# Patient Record
Sex: FEMALE | Race: BLACK OR AFRICAN AMERICAN | NOT HISPANIC OR LATINO | Employment: OTHER
[De-identification: names, ages, dates, MRNs, and addresses within clinical notes are randomized per-mention and may not be internally consistent; named-entity substitution may affect disease eponyms.]

---

## 2017-08-19 ENCOUNTER — HEALTH MAINTENANCE LETTER (OUTPATIENT)
Age: 37
End: 2017-08-19

## 2023-02-10 ENCOUNTER — TRANSFERRED RECORDS (OUTPATIENT)
Dept: HEALTH INFORMATION MANAGEMENT | Facility: CLINIC | Age: 43
End: 2023-02-10

## 2023-04-05 LAB
HEMOGLOBIN (EXTERNAL): 14 G/DL (ref 12–16)
HEPATITIS B SURFACE ANTIGEN (EXTERNAL): NONREACTIVE
HEPATITIS C ANTIBODY (EXTERNAL): NONREACTIVE
HIV1+2 AB SERPL QL IA: NONREACTIVE
PLATELET COUNT (EXTERNAL): 263 10E3/UL (ref 150–400)
RUBELLA ANTIBODY IGG (EXTERNAL): NORMAL
TREPONEMA PALLIDUM ANTIBODY (EXTERNAL): NONREACTIVE

## 2023-04-10 ENCOUNTER — MEDICAL CORRESPONDENCE (OUTPATIENT)
Dept: HEALTH INFORMATION MANAGEMENT | Facility: CLINIC | Age: 43
End: 2023-04-10
Payer: COMMERCIAL

## 2023-04-10 ENCOUNTER — TRANSCRIBE ORDERS (OUTPATIENT)
Dept: MATERNAL FETAL MEDICINE | Facility: HOSPITAL | Age: 43
End: 2023-04-10
Payer: COMMERCIAL

## 2023-04-10 DIAGNOSIS — O26.90 PREGNANCY RELATED CONDITION, ANTEPARTUM: Primary | ICD-10-CM

## 2023-08-10 ENCOUNTER — MEDICAL CORRESPONDENCE (OUTPATIENT)
Dept: HEALTH INFORMATION MANAGEMENT | Facility: CLINIC | Age: 43
End: 2023-08-10
Payer: COMMERCIAL

## 2023-08-10 ENCOUNTER — TRANSCRIBE ORDERS (OUTPATIENT)
Dept: MATERNAL FETAL MEDICINE | Facility: HOSPITAL | Age: 43
End: 2023-08-10
Payer: COMMERCIAL

## 2023-08-10 DIAGNOSIS — O26.90 PREGNANCY RELATED CONDITION, ANTEPARTUM: Primary | ICD-10-CM

## 2023-08-14 ENCOUNTER — PRE VISIT (OUTPATIENT)
Dept: MATERNAL FETAL MEDICINE | Facility: HOSPITAL | Age: 43
End: 2023-08-14
Payer: COMMERCIAL

## 2023-08-16 ENCOUNTER — OFFICE VISIT (OUTPATIENT)
Dept: MATERNAL FETAL MEDICINE | Facility: HOSPITAL | Age: 43
End: 2023-08-16
Attending: OBSTETRICS & GYNECOLOGY
Payer: COMMERCIAL

## 2023-08-16 ENCOUNTER — ANCILLARY PROCEDURE (OUTPATIENT)
Dept: ULTRASOUND IMAGING | Facility: HOSPITAL | Age: 43
End: 2023-08-16
Attending: OBSTETRICS & GYNECOLOGY
Payer: COMMERCIAL

## 2023-08-16 DIAGNOSIS — O09.522 MULTIGRAVIDA OF ADVANCED MATERNAL AGE IN SECOND TRIMESTER: Primary | ICD-10-CM

## 2023-08-16 DIAGNOSIS — O26.90 PREGNANCY RELATED CONDITION, ANTEPARTUM: ICD-10-CM

## 2023-08-16 PROCEDURE — 76811 OB US DETAILED SNGL FETUS: CPT | Mod: 26 | Performed by: OBSTETRICS & GYNECOLOGY

## 2023-08-16 PROCEDURE — 99207 PR NO CHARGE LOS: CPT | Performed by: OBSTETRICS & GYNECOLOGY

## 2023-08-16 PROCEDURE — 76811 OB US DETAILED SNGL FETUS: CPT

## 2023-08-16 NOTE — NURSING NOTE
Racheal Banks is a  at 27w4d who presents to [facility] for a comprehensive ultrasound. Pt reports positive fetal movement. SBAR given to Dr. Knox, see note in Epic.      Mira Rubin RN

## 2023-08-16 NOTE — PROGRESS NOTES
Please see full imaging report from ViewPoint program under imaging tab.    Jeremy Knox MD  Maternal Fetal Medicine

## 2023-10-11 RX ORDER — INSULIN ASPART 100 [IU]/ML
INJECTION, SOLUTION INTRAVENOUS; SUBCUTANEOUS
Qty: 15 ML | OUTPATIENT
Start: 2023-10-11

## 2023-10-11 RX ORDER — INSULIN ASPART 100 [IU]/ML
10 INJECTION, SOLUTION INTRAVENOUS; SUBCUTANEOUS
Status: ON HOLD | COMMUNITY
Start: 2023-10-11 | End: 2023-11-10

## 2023-10-11 NOTE — TELEPHONE ENCOUNTER
Pending Prescriptions:                       Disp   Refills    Insulin Aspart FlexPen 100 UNIT/ML SOPN   15 mL             Signed Prescriptions:                        Disp   Refills    Insulin Aspart FlexPen 100 UNIT/ML SOPN                    Authorizing Provider: PATIENT REPORTED  Ordering User: DENISHA ERIC

## 2023-10-20 LAB — GROUP B STREPTOCOCCUS (EXTERNAL): NEGATIVE

## 2023-11-07 ENCOUNTER — TRANSFERRED RECORDS (OUTPATIENT)
Dept: HEALTH INFORMATION MANAGEMENT | Facility: CLINIC | Age: 43
End: 2023-11-07
Payer: COMMERCIAL

## 2023-11-09 ENCOUNTER — HOSPITAL ENCOUNTER (INPATIENT)
Facility: HOSPITAL | Age: 43
LOS: 2 days | Discharge: HOME | End: 2023-11-11
Attending: ADVANCED PRACTICE MIDWIFE | Admitting: ADVANCED PRACTICE MIDWIFE
Payer: COMMERCIAL

## 2023-11-09 PROBLEM — Z34.90 PREGNANT: Status: ACTIVE | Noted: 2023-11-09

## 2023-11-09 LAB
ABO/RH(D): NORMAL
ABO/RH(D): NORMAL
ANTIBODY SCREEN: NEGATIVE
BASOPHILS # BLD AUTO: 0 10E3/UL (ref 0–0.2)
BASOPHILS NFR BLD AUTO: 0 %
EOSINOPHIL # BLD AUTO: 0 10E3/UL (ref 0–0.7)
EOSINOPHIL NFR BLD AUTO: 0 %
ERYTHROCYTE [DISTWIDTH] IN BLOOD BY AUTOMATED COUNT: 14.6 % (ref 10–15)
GLUCOSE BLDC GLUCOMTR-MCNC: 104 MG/DL (ref 70–99)
GLUCOSE BLDC GLUCOMTR-MCNC: 116 MG/DL (ref 70–99)
GLUCOSE BLDC GLUCOMTR-MCNC: 125 MG/DL (ref 70–99)
GLUCOSE BLDC GLUCOMTR-MCNC: 130 MG/DL (ref 70–99)
GLUCOSE BLDC GLUCOMTR-MCNC: 137 MG/DL (ref 70–99)
GLUCOSE BLDC GLUCOMTR-MCNC: 197 MG/DL (ref 70–99)
HCT VFR BLD AUTO: 40.8 % (ref 35–47)
HGB BLD-MCNC: 13.7 G/DL (ref 11.7–15.7)
HGB BLD-MCNC: 13.7 G/DL (ref 11.7–15.7)
IMM GRANULOCYTES # BLD: 0.1 10E3/UL
IMM GRANULOCYTES NFR BLD: 1 %
LYMPHOCYTES # BLD AUTO: 1.1 10E3/UL (ref 0.8–5.3)
LYMPHOCYTES NFR BLD AUTO: 16 %
MCH RBC QN AUTO: 29.4 PG (ref 26.5–33)
MCHC RBC AUTO-ENTMCNC: 33.2 G/DL (ref 31.5–36.5)
MCV RBC AUTO: 88 FL (ref 78–100)
MONOCYTES # BLD AUTO: 0.4 10E3/UL (ref 0–1.3)
MONOCYTES NFR BLD AUTO: 7 %
NEUTROPHILS # BLD AUTO: 5.2 10E3/UL (ref 1.6–8.3)
NEUTROPHILS NFR BLD AUTO: 76 %
NRBC # BLD AUTO: 0 10E3/UL
NRBC BLD AUTO-RTO: 0 /100
PLATELET # BLD AUTO: 91 10E3/UL (ref 150–450)
RBC # BLD AUTO: 4.66 10E6/UL (ref 3.8–5.2)
SPECIMEN EXPIRATION DATE: NORMAL
SPECIMEN EXPIRATION DATE: NORMAL
T PALLIDUM AB SER QL: NONREACTIVE
WBC # BLD AUTO: 6.7 10E3/UL (ref 4–11)

## 2023-11-09 PROCEDURE — 10907ZC DRAINAGE OF AMNIOTIC FLUID, THERAPEUTIC FROM PRODUCTS OF CONCEPTION, VIA NATURAL OR ARTIFICIAL OPENING: ICD-10-PCS | Performed by: ADVANCED PRACTICE MIDWIFE

## 2023-11-09 PROCEDURE — 250N000013 HC RX MED GY IP 250 OP 250 PS 637: Performed by: ADVANCED PRACTICE MIDWIFE

## 2023-11-09 PROCEDURE — 722N000001 HC LABOR CARE VAGINAL DELIVERY SINGLE

## 2023-11-09 PROCEDURE — 85025 COMPLETE CBC W/AUTO DIFF WBC: CPT | Performed by: ADVANCED PRACTICE MIDWIFE

## 2023-11-09 PROCEDURE — 86901 BLOOD TYPING SEROLOGIC RH(D): CPT | Performed by: REGISTERED NURSE

## 2023-11-09 PROCEDURE — 85018 HEMOGLOBIN: CPT | Performed by: REGISTERED NURSE

## 2023-11-09 PROCEDURE — 36415 COLL VENOUS BLD VENIPUNCTURE: CPT | Performed by: REGISTERED NURSE

## 2023-11-09 PROCEDURE — 120N000001 HC R&B MED SURG/OB

## 2023-11-09 PROCEDURE — 250N000013 HC RX MED GY IP 250 OP 250 PS 637: Performed by: REGISTERED NURSE

## 2023-11-09 PROCEDURE — 258N000003 HC RX IP 258 OP 636: Performed by: REGISTERED NURSE

## 2023-11-09 PROCEDURE — 0DQR0ZZ REPAIR ANAL SPHINCTER, OPEN APPROACH: ICD-10-PCS | Performed by: OBSTETRICS & GYNECOLOGY

## 2023-11-09 PROCEDURE — 86780 TREPONEMA PALLIDUM: CPT | Performed by: REGISTERED NURSE

## 2023-11-09 PROCEDURE — 250N000011 HC RX IP 250 OP 636: Mod: JZ | Performed by: REGISTERED NURSE

## 2023-11-09 PROCEDURE — 250N000009 HC RX 250: Performed by: REGISTERED NURSE

## 2023-11-09 RX ORDER — OXYCODONE HYDROCHLORIDE 5 MG/1
5 TABLET ORAL EVERY 4 HOURS PRN
Status: DISCONTINUED | OUTPATIENT
Start: 2023-11-09 | End: 2023-11-11 | Stop reason: HOSPADM

## 2023-11-09 RX ORDER — OXYTOCIN/0.9 % SODIUM CHLORIDE 30/500 ML
100-340 PLASTIC BAG, INJECTION (ML) INTRAVENOUS CONTINUOUS PRN
Status: DISCONTINUED | OUTPATIENT
Start: 2023-11-09 | End: 2023-11-11 | Stop reason: HOSPADM

## 2023-11-09 RX ORDER — HYDROCORTISONE 25 MG/G
CREAM TOPICAL 3 TIMES DAILY PRN
Status: DISCONTINUED | OUTPATIENT
Start: 2023-11-09 | End: 2023-11-11 | Stop reason: HOSPADM

## 2023-11-09 RX ORDER — NALOXONE HYDROCHLORIDE 0.4 MG/ML
0.4 INJECTION, SOLUTION INTRAMUSCULAR; INTRAVENOUS; SUBCUTANEOUS
Status: DISCONTINUED | OUTPATIENT
Start: 2023-11-09 | End: 2023-11-11 | Stop reason: HOSPADM

## 2023-11-09 RX ORDER — KETOROLAC TROMETHAMINE 30 MG/ML
30 INJECTION, SOLUTION INTRAMUSCULAR; INTRAVENOUS
Status: DISCONTINUED | OUTPATIENT
Start: 2023-11-09 | End: 2023-11-11 | Stop reason: HOSPADM

## 2023-11-09 RX ORDER — ENOXAPARIN SODIUM 100 MG/ML
40 INJECTION SUBCUTANEOUS DAILY
Status: ON HOLD | COMMUNITY
Start: 2023-10-24 | End: 2023-11-10

## 2023-11-09 RX ORDER — CARBOPROST TROMETHAMINE 250 UG/ML
250 INJECTION, SOLUTION INTRAMUSCULAR
Status: DISCONTINUED | OUTPATIENT
Start: 2023-11-09 | End: 2023-11-09 | Stop reason: HOSPADM

## 2023-11-09 RX ORDER — CITRIC ACID/SODIUM CITRATE 334-500MG
30 SOLUTION, ORAL ORAL
Status: DISCONTINUED | OUTPATIENT
Start: 2023-11-09 | End: 2023-11-09 | Stop reason: HOSPADM

## 2023-11-09 RX ORDER — MISOPROSTOL 200 UG/1
800 TABLET ORAL
Status: DISCONTINUED | OUTPATIENT
Start: 2023-11-09 | End: 2023-11-11 | Stop reason: HOSPADM

## 2023-11-09 RX ORDER — NALOXONE HYDROCHLORIDE 0.4 MG/ML
0.2 INJECTION, SOLUTION INTRAMUSCULAR; INTRAVENOUS; SUBCUTANEOUS
Status: DISCONTINUED | OUTPATIENT
Start: 2023-11-09 | End: 2023-11-09 | Stop reason: HOSPADM

## 2023-11-09 RX ORDER — DOCUSATE SODIUM 100 MG/1
100 CAPSULE, LIQUID FILLED ORAL 2 TIMES DAILY
Status: DISCONTINUED | OUTPATIENT
Start: 2023-11-09 | End: 2023-11-11 | Stop reason: HOSPADM

## 2023-11-09 RX ORDER — OXYTOCIN 10 [USP'U]/ML
10 INJECTION, SOLUTION INTRAMUSCULAR; INTRAVENOUS
Status: DISCONTINUED | OUTPATIENT
Start: 2023-11-09 | End: 2023-11-11 | Stop reason: HOSPADM

## 2023-11-09 RX ORDER — DEXTROSE MONOHYDRATE 25 G/50ML
25-50 INJECTION, SOLUTION INTRAVENOUS
Status: DISCONTINUED | OUTPATIENT
Start: 2023-11-09 | End: 2023-11-11 | Stop reason: HOSPADM

## 2023-11-09 RX ORDER — METOCLOPRAMIDE 10 MG/1
10 TABLET ORAL EVERY 6 HOURS PRN
Status: DISCONTINUED | OUTPATIENT
Start: 2023-11-09 | End: 2023-11-09 | Stop reason: HOSPADM

## 2023-11-09 RX ORDER — DEXTROSE MONOHYDRATE 25 G/50ML
25-50 INJECTION, SOLUTION INTRAVENOUS
Status: DISCONTINUED | OUTPATIENT
Start: 2023-11-09 | End: 2023-11-09 | Stop reason: HOSPADM

## 2023-11-09 RX ORDER — MISOPROSTOL 200 UG/1
400 TABLET ORAL
Status: DISCONTINUED | OUTPATIENT
Start: 2023-11-09 | End: 2023-11-11 | Stop reason: HOSPADM

## 2023-11-09 RX ORDER — NALOXONE HYDROCHLORIDE 0.4 MG/ML
0.2 INJECTION, SOLUTION INTRAMUSCULAR; INTRAVENOUS; SUBCUTANEOUS
Status: DISCONTINUED | OUTPATIENT
Start: 2023-11-09 | End: 2023-11-11 | Stop reason: HOSPADM

## 2023-11-09 RX ORDER — NALOXONE HYDROCHLORIDE 0.4 MG/ML
0.4 INJECTION, SOLUTION INTRAMUSCULAR; INTRAVENOUS; SUBCUTANEOUS
Status: DISCONTINUED | OUTPATIENT
Start: 2023-11-09 | End: 2023-11-09 | Stop reason: HOSPADM

## 2023-11-09 RX ORDER — NICOTINE POLACRILEX 4 MG
15-30 LOZENGE BUCCAL
Status: DISCONTINUED | OUTPATIENT
Start: 2023-11-09 | End: 2023-11-11 | Stop reason: HOSPADM

## 2023-11-09 RX ORDER — METHYLERGONOVINE MALEATE 0.2 MG/ML
200 INJECTION INTRAVENOUS
Status: DISCONTINUED | OUTPATIENT
Start: 2023-11-09 | End: 2023-11-09 | Stop reason: HOSPADM

## 2023-11-09 RX ORDER — MODIFIED LANOLIN
OINTMENT (GRAM) TOPICAL
Status: DISCONTINUED | OUTPATIENT
Start: 2023-11-09 | End: 2023-11-11 | Stop reason: HOSPADM

## 2023-11-09 RX ORDER — PROCHLORPERAZINE 25 MG
25 SUPPOSITORY, RECTAL RECTAL EVERY 12 HOURS PRN
Status: DISCONTINUED | OUTPATIENT
Start: 2023-11-09 | End: 2023-11-09 | Stop reason: HOSPADM

## 2023-11-09 RX ORDER — OXYTOCIN 10 [USP'U]/ML
10 INJECTION, SOLUTION INTRAMUSCULAR; INTRAVENOUS
Status: DISCONTINUED | OUTPATIENT
Start: 2023-11-09 | End: 2023-11-09 | Stop reason: HOSPADM

## 2023-11-09 RX ORDER — SODIUM CHLORIDE 9 MG/ML
INJECTION, SOLUTION INTRAVENOUS CONTINUOUS
Status: DISCONTINUED | OUTPATIENT
Start: 2023-11-09 | End: 2023-11-09 | Stop reason: HOSPADM

## 2023-11-09 RX ORDER — INSULIN DETEMIR 100 [IU]/ML
22 INJECTION, SOLUTION SUBCUTANEOUS AT BEDTIME
Status: ON HOLD | COMMUNITY
Start: 2023-11-01 | End: 2023-11-10

## 2023-11-09 RX ORDER — NICOTINE POLACRILEX 4 MG
15-30 LOZENGE BUCCAL
Status: DISCONTINUED | OUTPATIENT
Start: 2023-11-09 | End: 2023-11-09 | Stop reason: HOSPADM

## 2023-11-09 RX ORDER — IBUPROFEN 800 MG/1
800 TABLET, FILM COATED ORAL EVERY 6 HOURS PRN
Status: DISCONTINUED | OUTPATIENT
Start: 2023-11-09 | End: 2023-11-11 | Stop reason: HOSPADM

## 2023-11-09 RX ORDER — OXYTOCIN/0.9 % SODIUM CHLORIDE 30/500 ML
340 PLASTIC BAG, INJECTION (ML) INTRAVENOUS CONTINUOUS PRN
Status: DISCONTINUED | OUTPATIENT
Start: 2023-11-09 | End: 2023-11-09 | Stop reason: HOSPADM

## 2023-11-09 RX ORDER — IBUPROFEN 800 MG/1
800 TABLET, FILM COATED ORAL
Status: DISCONTINUED | OUTPATIENT
Start: 2023-11-09 | End: 2023-11-11 | Stop reason: HOSPADM

## 2023-11-09 RX ORDER — MISOPROSTOL 200 UG/1
400 TABLET ORAL
Status: DISCONTINUED | OUTPATIENT
Start: 2023-11-09 | End: 2023-11-09 | Stop reason: HOSPADM

## 2023-11-09 RX ORDER — FENTANYL CITRATE 50 UG/ML
100 INJECTION, SOLUTION INTRAMUSCULAR; INTRAVENOUS
Status: DISCONTINUED | OUTPATIENT
Start: 2023-11-09 | End: 2023-11-09 | Stop reason: HOSPADM

## 2023-11-09 RX ORDER — CARBOPROST TROMETHAMINE 250 UG/ML
250 INJECTION, SOLUTION INTRAMUSCULAR
Status: DISCONTINUED | OUTPATIENT
Start: 2023-11-09 | End: 2023-11-11 | Stop reason: HOSPADM

## 2023-11-09 RX ORDER — PROCHLORPERAZINE MALEATE 10 MG
10 TABLET ORAL EVERY 6 HOURS PRN
Status: DISCONTINUED | OUTPATIENT
Start: 2023-11-09 | End: 2023-11-09 | Stop reason: HOSPADM

## 2023-11-09 RX ORDER — ONDANSETRON 4 MG/1
4 TABLET, ORALLY DISINTEGRATING ORAL EVERY 6 HOURS PRN
Status: DISCONTINUED | OUTPATIENT
Start: 2023-11-09 | End: 2023-11-09 | Stop reason: HOSPADM

## 2023-11-09 RX ORDER — METOCLOPRAMIDE HYDROCHLORIDE 5 MG/ML
10 INJECTION INTRAMUSCULAR; INTRAVENOUS EVERY 6 HOURS PRN
Status: DISCONTINUED | OUTPATIENT
Start: 2023-11-09 | End: 2023-11-09 | Stop reason: HOSPADM

## 2023-11-09 RX ORDER — ACETAMINOPHEN 325 MG/1
650 TABLET ORAL EVERY 4 HOURS PRN
Status: DISCONTINUED | OUTPATIENT
Start: 2023-11-09 | End: 2023-11-11 | Stop reason: HOSPADM

## 2023-11-09 RX ORDER — ACETAMINOPHEN 325 MG/1
650 TABLET ORAL EVERY 4 HOURS PRN
Status: DISCONTINUED | OUTPATIENT
Start: 2023-11-09 | End: 2023-11-09 | Stop reason: HOSPADM

## 2023-11-09 RX ORDER — MISOPROSTOL 200 UG/1
800 TABLET ORAL
Status: DISCONTINUED | OUTPATIENT
Start: 2023-11-09 | End: 2023-11-09 | Stop reason: HOSPADM

## 2023-11-09 RX ORDER — HEPARIN SODIUM 10000 [USP'U]/ML
10000 INJECTION, SOLUTION INTRAVENOUS; SUBCUTANEOUS EVERY 12 HOURS
Status: ON HOLD | COMMUNITY
Start: 2023-10-17 | End: 2023-11-10

## 2023-11-09 RX ORDER — OXYTOCIN/0.9 % SODIUM CHLORIDE 30/500 ML
340 PLASTIC BAG, INJECTION (ML) INTRAVENOUS CONTINUOUS PRN
Status: DISCONTINUED | OUTPATIENT
Start: 2023-11-09 | End: 2023-11-11 | Stop reason: HOSPADM

## 2023-11-09 RX ORDER — METHYLERGONOVINE MALEATE 0.2 MG/ML
200 INJECTION INTRAVENOUS
Status: DISCONTINUED | OUTPATIENT
Start: 2023-11-09 | End: 2023-11-11 | Stop reason: HOSPADM

## 2023-11-09 RX ORDER — ONDANSETRON 2 MG/ML
4 INJECTION INTRAMUSCULAR; INTRAVENOUS EVERY 6 HOURS PRN
Status: DISCONTINUED | OUTPATIENT
Start: 2023-11-09 | End: 2023-11-09 | Stop reason: HOSPADM

## 2023-11-09 RX ADMIN — ACETAMINOPHEN 650 MG: 325 TABLET ORAL at 17:05

## 2023-11-09 RX ADMIN — ACETAMINOPHEN 650 MG: 325 TABLET ORAL at 22:44

## 2023-11-09 RX ADMIN — DOCUSATE SODIUM 100 MG: 100 CAPSULE, LIQUID FILLED ORAL at 22:44

## 2023-11-09 RX ADMIN — KETOROLAC TROMETHAMINE 30 MG: 30 INJECTION, SOLUTION INTRAMUSCULAR; INTRAVENOUS at 13:53

## 2023-11-09 RX ADMIN — IBUPROFEN 800 MG: 800 TABLET ORAL at 20:04

## 2023-11-09 RX ADMIN — BENZOCAINE AND LEVOMENTHOL: 200; 5 SPRAY TOPICAL at 13:53

## 2023-11-09 RX ADMIN — WITCH HAZEL: 500 SOLUTION RECTAL; TOPICAL at 13:53

## 2023-11-09 RX ADMIN — SODIUM CHLORIDE, POTASSIUM CHLORIDE, SODIUM LACTATE AND CALCIUM CHLORIDE 500 ML: 600; 310; 30; 20 INJECTION, SOLUTION INTRAVENOUS at 09:32

## 2023-11-09 RX ADMIN — Medication 340 ML/HR: at 12:46

## 2023-11-09 RX ADMIN — ACETAMINOPHEN 650 MG: 325 TABLET ORAL at 11:56

## 2023-11-09 RX ADMIN — LIDOCAINE HYDROCHLORIDE 20 ML: 10 INJECTION, SOLUTION EPIDURAL; INFILTRATION; INTRACAUDAL; PERINEURAL at 12:45

## 2023-11-09 ASSESSMENT — ACTIVITIES OF DAILY LIVING (ADL)
ADLS_ACUITY_SCORE: 22
ADLS_ACUITY_SCORE: 22
ADLS_ACUITY_SCORE: 18
ADLS_ACUITY_SCORE: 22
ADLS_ACUITY_SCORE: 18
ADLS_ACUITY_SCORE: 22
ADLS_ACUITY_SCORE: 18

## 2023-11-09 NOTE — PROGRESS NOTES
Assumed care of patient from Baldev Gonzales RN. Patient resting in bed, breathing through ctx and coping well. Patient just had emesis, states she is feeling better and declines nausea med.

## 2023-11-09 NOTE — H&P
"Patient Name:  Racheal Banks  :      1980  MRN:      [id number]    Assessment:     at 39w5d  2nd stage of labor  Category 1 FHTs  AROM      Plan:   -Discussed R/B/A of amniotomy. She consents to this. Completed with return of clear fluid and Continue position changes to optimize fetal descent.  -Routine support & management. Encourage position changes, ambulation as appropriate, rest as desired.  -Anticipate progress and NSVB.   -Side lying release done on right side, declines left side  -Encouraged to push with contraction, baby is high, she isn't feeling pressure to push and wants to wait to push until she feels pressure  -Reevaluate progress in 2 hours or sooner with a change in status.     Subjective:  Racheal Banks is coping well with contractions. Using non pharmacologic  for pain relief. Good PO fluid intake. Voiding without issue. Family supportive at bedside.       Objective:  /66   Temp 98.8  F (37.1  C) (Oral)   Resp 16   Ht 1.702 m (5' 7\")   Wt 81.6 kg (180 lb)   LMP 02/10/2023   BMI 28.19 kg/m      FHR:Baseline: 135 bpm, Variability: Moderate (6 - 25 bpm), Accelerations: present and Decelerations: Absent    Uterine contractions:TocoFrequency: Every 1-2 minutes, Duration: 40-60 seconds and Intensity: strong    SVE:10/100/-2, above pubic bone    Provider: KENNA Rush CNM      Date:  2023  Time:  10:58 AMa  "

## 2023-11-09 NOTE — PROGRESS NOTES
VE and amniotomy done by NAHUM Gao. Cervix 10cm, large amt of clear fluid. Patient coping well, not feeling pushy yet. BG at 0945= 116, Sima notified, will hold off on insulin drip for now.

## 2023-11-09 NOTE — L&D DELIVERY NOTE
Vaginal Delivery Note    Name: Racheal Banks  : 1980  MRN: [id number]    PRE DELIVERY DIAGNOSIS   43 year old  5 Para  at 39w5d      AMA (41)  RPL and MTHFR-last heparin was over 12 hours ago  GDMA2     POST DELIVERY DIAGNOSIS  1) 43 year old  @ 39w5d  2) Delivery of a viable infant weighing 9lb 3oz  via     YOB: 2021     Birth Time: 12:39 PM       Augmentation Yes              Indication: at complete  Induction No                      Indication: none    Monitors: External     GBS: Negative    ROM: AROM  Fluid Type: Meconium    Labor Analgesia/Anesthesia:Non-pharmacologic    Cord pH obtained: Yes  Placenta Date/Time: 2023 12:42 PM   Placenta submitted to Pathology: No    Description of procedure:   43 year old  with PNC w/ MWC and pregnancy complicated by  see HPI  presented to L&D with labor contractions.  She was 6cm upon arrival and labored unmedicated.   After 3 hours, SVE revealed she was complete with fetal station -2, AROM completed with clear fluid at 0939. She did not feel an urge to push and did not want to start pushing until she felt pressure. After position changes, Racheal started to feel pressure to push and actively pushing at 1120. She pushed in the bed and on the toilet. As she started to crown, her labial tissue got tight around fetal head and unable to stretch due to infibulation scarring. Discussed episiotomy as FHTs audible at 85. Small LML cut, FSE placed, Dr Wyatt called to stand by. NICU team already present in room. On next push, head delivered JACQUELINE with double nuchal cord, delivered through, shoulders delivered easily with maternal pushing and gentle downward traction. Initially stunned baby was stimulated and after 45 secs, cord camped and cut and handed to NICU team, baby cried on way to warmer.  Shoulder Dystocia No  Operative Vaginal Delivery No  Infant spontaneously delivered over an  3rd degree laceration, see Dr Wyatt's  note for repair.    Placenta spontaneously delivered: 11/9/2023 12:42 PM  grossly intact with 3 vessel cord.  Infant:  Delivery was complicated by 3rd degree laceration 3rd stage Interventions included fundal massage and pitocin.    Delivery QBL (mL): 337    Mother and Infant stable.    KENNA Rush CNM    11/9/2023 5:06 PM

## 2023-11-09 NOTE — H&P
"HISTORY AND PHYSICAL UPDATE ADMISSION EXAM    Name: Racheal Banks  YOB: 1980  Medical Record Number: [id number]    History of Present Illness: Racheal Banks is a 43 year old female who is 39w5d pregnant and being admitted for active labor management. She has hx MTHFR with RPL, she used lovenox throughout pregnancy then changed to Heparin since 36w and had her last dose last evening. Her blood sugars have been managed well with insulin. Last growth US at 36w 4039g. All  testing has been reassuring.    Estimated Date of Delivery: 2023    EGA 39w5d    OB History    Para Term  AB Living   5 1 1 0 3 1   SAB IAB Ectopic Multiple Live Births   3 0 0 0 1      # Outcome Date GA Lbr Franklyn/2nd Weight Sex Delivery Anes PTL Lv   5 Current            4 2022     SAB      3 SAB      SAB      2 2020     SAB      1 Term 2017 42w0d  3.175 kg (7 lb)  Vag-Spont   MOLLY        Lab Results   Component Value Date    HGB 14.3 2013       Prenatal Complications:   AMA (41)  RPL and MTHFR-last heparin was over 12 hours ago  GDMA2       Exam:      Temp 98  F (36.7  C)   Resp 20   Ht 1.702 m (5' 7\")   Wt 81.6 kg (180 lb)   LMP 02/10/2023   BMI 28.19 kg/m      Fetal heart Rate Category 1  Contractions 2-3 minutes    HEENT grossly normal  Neck: no lymphadenopathy or thryoidomegaly  Lungs No resp distress  Heart RRR  ABD gravid, non-tender  EXT:  mild edema, moves freely  Vaginal exam 80/-2 on admission per RN  Membranes: intact    Assessment: active labor management  Plan: Admit - see IP orders  Pain medication PRN  MD consultant on call Dr Wyatt/ available prn  Anticipate     Prenatal record reviewed.    KENNA Rush CNM      2023   7:50 AM  "

## 2023-11-09 NOTE — PROGRESS NOTES
Patient fatigued but still coping well. Intermittently pushing. Trying many different positions such as standing at bedside, birthing ball, sitting on toilet, throne position in bed. BG at 1055= 130, Sima notified. Will continue to hold off on starting insulin infusion in anticipation of delivery.

## 2023-11-09 NOTE — PROGRESS NOTES
Will hold Lovenox at this time due to platelets at 91, SCDs ordered. Will recheck CBC in the am to determine plan for DVT prophylaxis as she has MTHFR.

## 2023-11-09 NOTE — PROCEDURES
Procedure note:  Called into delivery after infant was delivered to repair 3rd degree tear.  Lidocaine given 8cc  Anal sphincter repaired with 2-0 vicryl with a box stitch.    Remaining 2nd degree repaired with 3-0 vicryl  Pt did well.  Discussed keeping stools very loose.  Mona Wyatt MD

## 2023-11-10 PROBLEM — Z34.90 PREGNANT: Status: RESOLVED | Noted: 2023-11-09 | Resolved: 2023-11-10

## 2023-11-10 LAB
ERYTHROCYTE [DISTWIDTH] IN BLOOD BY AUTOMATED COUNT: 14.6 % (ref 10–15)
GLUCOSE BLDC GLUCOMTR-MCNC: 112 MG/DL (ref 70–99)
GLUCOSE BLDC GLUCOMTR-MCNC: 124 MG/DL (ref 70–99)
GLUCOSE BLDC GLUCOMTR-MCNC: 196 MG/DL (ref 70–99)
HCT VFR BLD AUTO: 35.7 % (ref 35–47)
HGB BLD-MCNC: 12.1 G/DL (ref 11.7–15.7)
MCH RBC QN AUTO: 29.4 PG (ref 26.5–33)
MCHC RBC AUTO-ENTMCNC: 33.9 G/DL (ref 31.5–36.5)
MCV RBC AUTO: 87 FL (ref 78–100)
PLATELET # BLD AUTO: 111 10E3/UL (ref 150–450)
RBC # BLD AUTO: 4.12 10E6/UL (ref 3.8–5.2)
WBC # BLD AUTO: 12.4 10E3/UL (ref 4–11)

## 2023-11-10 PROCEDURE — 85027 COMPLETE CBC AUTOMATED: CPT | Performed by: ADVANCED PRACTICE MIDWIFE

## 2023-11-10 PROCEDURE — 250N000013 HC RX MED GY IP 250 OP 250 PS 637: Performed by: ADVANCED PRACTICE MIDWIFE

## 2023-11-10 PROCEDURE — 36415 COLL VENOUS BLD VENIPUNCTURE: CPT | Performed by: ADVANCED PRACTICE MIDWIFE

## 2023-11-10 PROCEDURE — 120N000001 HC R&B MED SURG/OB

## 2023-11-10 RX ORDER — IBUPROFEN 800 MG/1
800 TABLET, FILM COATED ORAL EVERY 6 HOURS PRN
Qty: 60 TABLET | Refills: 0 | Status: SHIPPED | OUTPATIENT
Start: 2023-11-10

## 2023-11-10 RX ORDER — DOCUSATE SODIUM 100 MG/1
100 CAPSULE, LIQUID FILLED ORAL 2 TIMES DAILY PRN
Qty: 90 CAPSULE | Refills: 0 | Status: SHIPPED | OUTPATIENT
Start: 2023-11-10

## 2023-11-10 RX ORDER — ACETAMINOPHEN 325 MG/1
650 TABLET ORAL EVERY 4 HOURS PRN
Qty: 90 TABLET | Refills: 0 | Status: SHIPPED | OUTPATIENT
Start: 2023-11-10

## 2023-11-10 RX ADMIN — DOCUSATE SODIUM 100 MG: 100 CAPSULE, LIQUID FILLED ORAL at 22:43

## 2023-11-10 RX ADMIN — IBUPROFEN 800 MG: 800 TABLET ORAL at 22:43

## 2023-11-10 RX ADMIN — DOCUSATE SODIUM 100 MG: 100 CAPSULE, LIQUID FILLED ORAL at 09:43

## 2023-11-10 RX ADMIN — ACETAMINOPHEN 650 MG: 325 TABLET ORAL at 08:06

## 2023-11-10 RX ADMIN — IBUPROFEN 800 MG: 800 TABLET ORAL at 16:28

## 2023-11-10 RX ADMIN — IBUPROFEN 800 MG: 800 TABLET ORAL at 09:43

## 2023-11-10 RX ADMIN — ACETAMINOPHEN 650 MG: 325 TABLET ORAL at 03:43

## 2023-11-10 RX ADMIN — IBUPROFEN 800 MG: 800 TABLET ORAL at 03:43

## 2023-11-10 RX ADMIN — ACETAMINOPHEN 650 MG: 325 TABLET ORAL at 12:42

## 2023-11-10 ASSESSMENT — ACTIVITIES OF DAILY LIVING (ADL)
ADLS_ACUITY_SCORE: 22

## 2023-11-10 NOTE — PLAN OF CARE
Goal Outcome Evaluation:    Plan to discharge later this afternoon.                         Daughter Chayo

## 2023-11-10 NOTE — PROGRESS NOTES
"Vaginal Delivery Postpartum Day 1    Patient Name:  Racheal Banks  :      1980  MRN:      [id number]      Assessment:  Normal postpartum course.    Plan:  Continue current care. Desires discharge today if baby cleared for discharge.       Subjective:  The patient feels well:  Voiding without difficulty, lochia normal, tolerating normal diet, ambulating without difficulty and passing flatus. Voiding independently without complication. Pain is well controlled with current medications.  The patient has no emotional concerns.  The baby is well and being fed by breast.      YOB: 2021   Birth Time: 12:39 PM     Prenatal Complications include:   AMA (41)  RPL and MTHFR  GDMA2     Objective:  /58 (BP Location: Left arm, Patient Position: Semi-Hahn's, Cuff Size: Adult Regular)   Pulse 78   Temp 97.9  F (36.6  C) (Oral)   Resp 16   Ht 1.702 m (5' 7\")   Wt 81.6 kg (180 lb)   LMP 02/10/2023   SpO2 98%   Breastfeeding Unknown   BMI 28.19 kg/m    Patient Vitals for the past 24 hrs:   BP Temp Temp src Pulse Resp SpO2   11/10/23 0645 108/58 97.9  F (36.6  C) Oral 78 16 98 %   11/10/23 0237 108/57 98  F (36.7  C) Oral 73 18 97 %   23 2224 101/50 97.9  F (36.6  C) Oral 75 18 99 %   23 1830 121/59 98.3  F (36.8  C) Oral -- 16 --   23 1445 111/64 -- -- -- 18 --   23 1430 110/66 -- -- -- 16 --   23 1416 114/60 -- -- -- 16 --   23 1401 111/71 -- -- -- 16 --   23 1345 112/65 -- -- -- 16 --   23 1331 117/67 -- -- -- 16 --   23 1316 132/60 -- -- -- 18 --   23 1300 113/58 98.2  F (36.8  C) Oral -- 16 --   23 1245 108/56 -- -- -- 16 --   23 1043 -- -- -- -- 16 --       Exam: Patient A&O x 3. No acute distress, breathing unlabored.The amount and color of the lochia is appropriate for the duration of recovery. Uterine fundus is firm at U. Perineum:  not assessed, breastfeeding during visit. RN notes reviewed.       Lab Results "   Component Value Date    AS Negative 11/09/2023    HGB 12.1 11/10/2023       Immunization History   Administered Date(s) Administered    Influenza (IIV3) PF 11/18/2008    MMR 07/19/2004, 12/14/2004, 03/08/2005    TD,PF 7+ (Tenivac) 07/19/2004, 12/14/2004, 03/08/2005, 05/04/2005    Tetanus 12/14/2004       Provider:  KENNA Salas CNM    Date:  11/10/2023  Time:  10:39 AM

## 2023-11-10 NOTE — DISCHARGE SUMMARY
OB Discharge Summary      Date:  11/10/2023    Name:  Racheal Banks  :  1980  MRN:  [id number]      Admission Date:  2023  Delivery Date: 2023   Gestational Age at Delivery:  39w5d  Discharge Date:  11/10/2023    Principal Diagnosis:    Patient Active Problem List   Diagnosis     (normal spontaneous vaginal delivery)    Third degree perineal laceration         Conditions complicating Pregnancy:   AMA (41)  RPL and MTHFR  GDMA2     Procedure(s) Performed:   and 3rd deg lac repair    Indication for :  N/A  Indication for Induction:  N/A     Condition at Discharge:  stable    Discharge Medications:      Review of your medicines        START taking        Dose / Directions   acetaminophen 325 MG tablet  Commonly known as: TYLENOL      Dose: 650 mg  Take 2 tablets (650 mg) by mouth every 4 hours as needed for mild pain  Quantity: 90 tablet  Refills: 0     docusate sodium 100 MG capsule  Commonly known as: COLACE      Dose: 100 mg  Take 1 capsule (100 mg) by mouth 2 times daily as needed for constipation  Quantity: 90 capsule  Refills: 0     ibuprofen 800 MG tablet  Commonly known as: ADVIL/MOTRIN      Dose: 800 mg  Take 1 tablet (800 mg) by mouth every 6 hours as needed for other (cramping)  Quantity: 60 tablet  Refills: 0            CONTINUE these medicines which have NOT CHANGED        Dose / Directions   multivitamin w/minerals tablet  Used for: Preventative health care, Neck pain      Dose: 1 tablet  Take 1 tablet by mouth daily.  Quantity: 100 tablet  Refills: 3     NO ACTIVE MEDICATIONS      Refills: 0     vitamin D3 50 mcg (2000 units) tablet  Commonly known as: CHOLECALCIFEROL  Used for: Vitamin D deficiency      Dose: 2,000 Units  Take 2,000 Units by mouth daily.  Quantity: 100 tablet  Refills: 3            STOP taking      enoxaparin ANTICOAGULANT 40 MG/0.4ML syringe  Commonly known as: LOVENOX        heparin 74348 units/mL injection        Insulin Aspart FlexPen 100 UNIT/ML  Sopn        LEVEMIR FLEXPEN/FLEXTOUCH 100 UNIT/ML soln  Generic drug: insulin detemir                  Where to get your medicines        These medications were sent to [id number] - [place], [place] - [address] AT [facility] [address]  [address], [place] [address]      Hours: 24-hours Phone: [phone]   acetaminophen 325 MG tablet  docusate sodium 100 MG capsule  ibuprofen 800 MG tablet          Discharge Plan:    Follow up with /NAHUM:  6 weeks postpartum w/ [facility] provider   Patient Instructions:      Physical activity: As tolerated. Nothing in the vagina for 6 weeks.    Diet:  Regular    Medication: As listed above. May alternate ibuprofen and acetaminophen for pain management. Discussed stool softeners.     Other:        Physician/CNM: KENNA Salas CNM    Name:  Racheal Banks  :  1980  MRN:  [id number]

## 2023-11-10 NOTE — PROGRESS NOTES
Patient's vital signs are within normal limits. Patient ambulates independently and participates in infant cares and feeds. Patient is breastfeeding infant and supplementing with formula per her request. Voiding spontaneously.

## 2023-11-10 NOTE — PLAN OF CARE
Racheal's VSS. Pain well controlled with PRN ibuprofen and tylenol. Fundal assessment and bleeding WNL. Tucks, ice, and dermoplast being utilized. Up and independent. HS blood sugar was 197. Patient care order in for no treatment of elevated blood sugars. Racheal attentive to infant.   Problem: Adult Inpatient Plan of Care  Goal: Plan of Care Review  Description: The Plan of Care Review/Shift note should be completed every shift.  The Outcome Evaluation is a brief statement about your assessment that the patient is improving, declining, or no change.  This information will be displayed automatically on your shift  note.  Outcome: Progressing  Goal: Optimal Comfort and Wellbeing  Outcome: Progressing  Intervention: Provide Person-Centered Care  Recent Flowsheet Documentation  Taken 11/10/2023 0237 by Mimi Rider, RN  Trust Relationship/Rapport:   care explained   choices provided   empathic listening provided   questions answered   questions encouraged   thoughts/feelings acknowledged  Goal: Readiness for Transition of Care  Outcome: Progressing     Problem: Postpartum (Vaginal Delivery)  Goal: Successful Parent Role Transition  Outcome: Progressing  Goal: Hemostasis  Outcome: Progressing  Goal: Absence of Infection Signs and Symptoms  Outcome: Progressing  Goal: Optimal Pain Control and Function  Outcome: Progressing  Goal: Effective Urinary Elimination  Outcome: Progressing

## 2023-11-11 VITALS
DIASTOLIC BLOOD PRESSURE: 48 MMHG | BODY MASS INDEX: 27 KG/M2 | SYSTOLIC BLOOD PRESSURE: 112 MMHG | HEIGHT: 67 IN | OXYGEN SATURATION: 98 % | HEART RATE: 81 BPM | RESPIRATION RATE: 18 BRPM | WEIGHT: 172 LBS | TEMPERATURE: 98.2 F

## 2023-11-11 LAB — GLUCOSE BLDC GLUCOMTR-MCNC: 85 MG/DL (ref 70–99)

## 2023-11-11 PROCEDURE — 250N000013 HC RX MED GY IP 250 OP 250 PS 637: Performed by: ADVANCED PRACTICE MIDWIFE

## 2023-11-11 RX ADMIN — IBUPROFEN 800 MG: 800 TABLET ORAL at 10:32

## 2023-11-11 RX ADMIN — ACETAMINOPHEN 650 MG: 325 TABLET ORAL at 04:00

## 2023-11-11 RX ADMIN — DOCUSATE SODIUM 100 MG: 100 CAPSULE, LIQUID FILLED ORAL at 10:32

## 2023-11-11 RX ADMIN — IBUPROFEN 800 MG: 800 TABLET ORAL at 05:14

## 2023-11-11 ASSESSMENT — ACTIVITIES OF DAILY LIVING (ADL)
ADLS_ACUITY_SCORE: 22

## 2023-11-11 NOTE — PLAN OF CARE
Pt blood sugar was checked before dinner and it was WNL.    Pt is breastfeeding baby every 2 hours and also giving formula.   Pain is controlled with PO Tylenol and Ibuprofen. Up independently in room. Pt showered tonight.  Uterus is firm. Scant amount on candy pad, no clots.   Mom wanted to stay one more night and discharge tomorrow. Called Wilson and she was fine with pt staying.       Problem: Adult Inpatient Plan of Care  Goal: Optimal Comfort and Wellbeing  Outcome: Progressing  Intervention: Monitor Pain and Promote Comfort  Recent Flowsheet Documentation  Taken 11/10/2023 1610 by Carri Blue RN  Pain Management Interventions:   rest   medication (see MAR)  Intervention: Provide Person-Centered Care  Recent Flowsheet Documentation  Taken 11/10/2023 1610 by Carri Blue RN  Trust Relationship/Rapport:   care explained   choices provided     Problem: Labor  Goal: Stable Fetal Wellbeing  Outcome: Progressing  Intervention: Promote and Monitor Fetal Wellbeing  Recent Flowsheet Documentation  Taken 11/10/2023 1610 by Carri Blue RN  Body Position: position changed independently     Problem: Comorbidity Management  Goal: Blood Glucose Levels Within Targeted Range  Outcome: Progressing     Problem: Postpartum (Vaginal Delivery)  Goal: Successful Parent Role Transition  Outcome: Progressing  Goal: Hemostasis  Outcome: Progressing  Goal: Absence of Infection Signs and Symptoms  Outcome: Progressing  Goal: Anesthesia/Sedation Recovery  Outcome: Progressing  Goal: Optimal Pain Control and Function  Outcome: Progressing  Intervention: Prevent or Manage Pain  Recent Flowsheet Documentation  Taken 11/10/2023 1610 by Carri Blue RN  Pain Management Interventions:   rest   medication (see MAR)  Goal: Effective Urinary Elimination  Outcome: Progressing     Problem: Postpartum (Vaginal Delivery)  Goal: Successful Parent Role Transition  Outcome: Progressing  Goal: Hemostasis  Outcome: Progressing  Goal:  Absence of Infection Signs and Symptoms  Outcome: Progressing  Goal: Anesthesia/Sedation Recovery  Outcome: Progressing  Goal: Optimal Pain Control and Function  Outcome: Progressing  Intervention: Prevent or Manage Pain  Recent Flowsheet Documentation  Taken 11/10/2023 1610 by Carri Blue RN  Pain Management Interventions:   rest   medication (see MAR)  Goal: Effective Urinary Elimination  Outcome: Progressing   Goal Outcome Evaluation:

## 2023-11-11 NOTE — PLAN OF CARE
Racheal's VSS. Pain well controlled with PRN ibuprofen and tylenol. HS blood glucose was 196. Fundal assessment and bleeding WNL. Tucks and dermoplast being utilized. Up and independent. Attentive to infant. Plan to discharge this AM.   Problem: Adult Inpatient Plan of Care  Goal: Plan of Care Review  Description: The Plan of Care Review/Shift note should be completed every shift.  The Outcome Evaluation is a brief statement about your assessment that the patient is improving, declining, or no change.  This information will be displayed automatically on your shift  note.  Outcome: Progressing  Goal: Optimal Comfort and Wellbeing  Outcome: Progressing  Intervention: Monitor Pain and Promote Comfort  Recent Flowsheet Documentation  Taken 11/10/2023 2300 by Mimi Rider, RN  Pain Management Interventions:   rest   medication (see MAR)  Intervention: Provide Person-Centered Care  Recent Flowsheet Documentation  Taken 11/10/2023 2300 by Mimi Rider, RN  Trust Relationship/Rapport:   care explained   choices provided  Goal: Readiness for Transition of Care  Outcome: Progressing     Problem: Comorbidity Management  Goal: Blood Glucose Levels Within Targeted Range  Outcome: Progressing     Problem: Postpartum (Vaginal Delivery)  Goal: Successful Parent Role Transition  Outcome: Progressing  Goal: Hemostasis  Outcome: Progressing  Goal: Absence of Infection Signs and Symptoms  Outcome: Progressing  Goal: Optimal Pain Control and Function  Outcome: Progressing  Intervention: Prevent or Manage Pain  Recent Flowsheet Documentation  Taken 11/10/2023 2300 by Mimi Rider, RN  Pain Management Interventions:   rest   medication (see MAR)  Goal: Effective Urinary Elimination  Outcome: Progressing

## 2023-11-11 NOTE — PROGRESS NOTES
Discharge education reviewed with patient, as well as AVS instructions. Questions answered, and patient and  verbalize understanding. Rosetta Castañeda RN on 11/11/2023 at 12:31 PM

## 2023-11-11 NOTE — PROGRESS NOTES
"Vaginal Delivery Postpartum Day 2    Patient Name:  Racheal Banks  :      1980  MRN:      [id number]      Assessment:  Normal postpartum course. Elevated blood glucose last night (196) however normal fasting today at 85.    Plan:  Continue current care. Discharge to home with plan to continue checking  blood glucose levels at home. Follow up with diabetes educators with continued elevated blood pressures.    Subjective:  The patient feels well:  Voiding without difficulty, lochia normal, tolerating normal diet, ambulating without difficulty and passing flatus.  She denies headache, vision changes, URQ/epigastric pain, dizziness, lightheadedness, shortness of breath, and tachycardia. Voiding independently without complication. Pain is well controlled with current medications.  The patient has no emotional concerns.  The baby is well and being fed by both breast and bottle.    YOB: 2021   Birth Time: 12:39 PM     Prenatal complications:  AMA (41)  RPL and MTHFR  GDMA2     Objective:  /56 (BP Location: Left arm, Patient Position: Semi-Hahn's, Cuff Size: Adult Regular)   Pulse 72   Temp 98  F (36.7  C) (Oral)   Resp 16   Ht 1.702 m (5' 7\")   Wt 78 kg (172 lb)   LMP 02/10/2023   SpO2 98%   Breastfeeding Unknown   BMI 26.94 kg/m      .  Patient Vitals for the past 24 hrs:   BP Temp Temp src Pulse Resp SpO2 Weight   23 0835 -- -- -- -- -- -- 78 kg (172 lb)   11/10/23 2300 104/56 98  F (36.7  C) Oral 72 16 98 % --   11/10/23 1610 109/70 98.7  F (37.1  C) Oral 83 18 99 % --   11/10/23 1300 -- -- -- -- -- -- 82.6 kg (182 lb)   11/10/23 1045 110/62 97.8  F (36.6  C) Oral 76 18 -- --       Exam: Patient A&O x 3. No acute distress, breathing unlabored.The amount and color of the lochia is appropriate for the duration of recovery. Uterine fundus is firm at U-1. Perineum: sutures intact     Lab Results   Component Value Date    AS Negative 2023    HGB 12.1 11/10/2023 "       Immunization History   Administered Date(s) Administered    Influenza (IIV3) PF 11/18/2008    MMR 07/19/2004, 12/14/2004, 03/08/2005    TD,PF 7+ (Tenivac) 07/19/2004, 12/14/2004, 03/08/2005, 05/04/2005    Tetanus 12/14/2004       Provider:  KENNA Harrington CNM    Date:  11/11/2023  Time:  8:57 AM

## 2023-11-11 NOTE — DISCHARGE SUMMARY
OB Discharge Summary      Date:  2023    Name:  Racheal Banks  :  1980  MRN:  [id number]      Admission Date:  2023  Delivery Date: 2023   Gestational Age at Delivery:  39w5d  Discharge Date:  2023    Principal Diagnosis:    Patient Active Problem List   Diagnosis     (normal spontaneous vaginal delivery)    Third degree perineal laceration         Conditions complicating Pregnancy:   AMA (41)  RPL and MTHFR  GDMA2     Procedure(s) Performed:  , Episiotomy, Repair of 3rd degree laceration    Indication for :  None  Indication for Induction:  None     Condition at Discharge:  Good    Discharge Medications:      Review of your medicines        START taking        Dose / Directions   acetaminophen 325 MG tablet  Commonly known as: TYLENOL      Dose: 650 mg  Take 2 tablets (650 mg) by mouth every 4 hours as needed for mild pain  Quantity: 90 tablet  Refills: 0     docusate sodium 100 MG capsule  Commonly known as: COLACE      Dose: 100 mg  Take 1 capsule (100 mg) by mouth 2 times daily as needed for constipation  Quantity: 90 capsule  Refills: 0     ibuprofen 800 MG tablet  Commonly known as: ADVIL/MOTRIN      Dose: 800 mg  Take 1 tablet (800 mg) by mouth every 6 hours as needed for other (cramping)  Quantity: 60 tablet  Refills: 0            CONTINUE these medicines which have NOT CHANGED        Dose / Directions   multivitamin w/minerals tablet  Used for: Preventative health care, Neck pain      Dose: 1 tablet  Take 1 tablet by mouth daily.  Quantity: 100 tablet  Refills: 3     NO ACTIVE MEDICATIONS      Refills: 0     vitamin D3 50 mcg (2000 units) tablet  Commonly known as: CHOLECALCIFEROL  Used for: Vitamin D deficiency      Dose: 2,000 Units  Take 2,000 Units by mouth daily.  Quantity: 100 tablet  Refills: 3            STOP taking      enoxaparin ANTICOAGULANT 40 MG/0.4ML syringe  Commonly known as: LOVENOX        heparin 23200 units/mL injection        Insulin  Aspart FlexPen 100 UNIT/ML Sopn        LEVEMIR FLEXPEN/FLEXTOUCH 100 UNIT/ML soln  Generic drug: insulin detemir                  Where to get your medicines        These medications were sent to [id number] - [place], [place] - [address] AT [facility] [address]  [address], [place] [address]      Hours: 24-hours Phone: [phone]   acetaminophen 325 MG tablet  docusate sodium 100 MG capsule  ibuprofen 800 MG tablet          Discharge Plan:    Follow up with /CNM:  Follow with diabetes educator in 2 weeks for blood glucose check and in 6 weeks for a postpartum visit   Patient Instructions:      Physical activity: As tolerated. Nothing in the vagina for 6 weeks.     Diet:  Regular.      Medication: As listed above. May alternate ibuprofen and acetaminophen for pain management.     Other:  Please continue checking your blood glucose levels at home for the next two weeks      Physician/CNM: KENNA HarringtonM    Name:  Racheal Banks  :  1980  MRN:  [id number]

## 2025-01-08 ENCOUNTER — MEDICAL CORRESPONDENCE (OUTPATIENT)
Dept: HEALTH INFORMATION MANAGEMENT | Facility: CLINIC | Age: 45
End: 2025-01-08
Payer: COMMERCIAL

## 2025-01-08 LAB
ABO (EXTERNAL): NORMAL
HEMOGLOBIN (EXTERNAL): 13.4 G/DL (ref 11.7–15.5)
HEPATITIS B SURFACE ANTIGEN (EXTERNAL): NONREACTIVE
HIV1+2 AB SERPL QL IA: NONREACTIVE
PLATELET COUNT (EXTERNAL): 241 10E3/UL (ref 140–400)
RH (EXTERNAL): POSITIVE
RUBELLA ANTIBODY IGG (EXTERNAL): NORMAL
TREPONEMA PALLIDUM ANTIBODY (EXTERNAL): NONREACTIVE

## 2025-01-10 ENCOUNTER — MEDICAL CORRESPONDENCE (OUTPATIENT)
Dept: HEALTH INFORMATION MANAGEMENT | Facility: CLINIC | Age: 45
End: 2025-01-10
Payer: COMMERCIAL

## 2025-01-13 ENCOUNTER — TRANSCRIBE ORDERS (OUTPATIENT)
Dept: MATERNAL FETAL MEDICINE | Facility: CLINIC | Age: 45
End: 2025-01-13
Payer: COMMERCIAL

## 2025-01-13 DIAGNOSIS — O26.90 PREGNANCY RELATED CONDITION, ANTEPARTUM: Primary | ICD-10-CM

## 2025-03-12 ENCOUNTER — TRANSFERRED RECORDS (OUTPATIENT)
Dept: HEALTH INFORMATION MANAGEMENT | Facility: CLINIC | Age: 45
End: 2025-03-12
Payer: COMMERCIAL

## 2025-03-13 ENCOUNTER — OFFICE VISIT (OUTPATIENT)
Dept: MATERNAL FETAL MEDICINE | Facility: CLINIC | Age: 45
End: 2025-03-13
Attending: STUDENT IN AN ORGANIZED HEALTH CARE EDUCATION/TRAINING PROGRAM
Payer: COMMERCIAL

## 2025-03-13 ENCOUNTER — HOSPITAL ENCOUNTER (OUTPATIENT)
Dept: ULTRASOUND IMAGING | Facility: CLINIC | Age: 45
Discharge: HOME | End: 2025-03-13
Attending: STUDENT IN AN ORGANIZED HEALTH CARE EDUCATION/TRAINING PROGRAM
Payer: COMMERCIAL

## 2025-03-13 DIAGNOSIS — O24.112 PRE-EXISTING TYPE 2 DIABETES MELLITUS DURING PREGNANCY IN SECOND TRIMESTER: Primary | ICD-10-CM

## 2025-03-13 DIAGNOSIS — O26.90 PREGNANCY RELATED CONDITION, ANTEPARTUM: ICD-10-CM

## 2025-03-13 DIAGNOSIS — O09.522 MULTIGRAVIDA OF ADVANCED MATERNAL AGE IN SECOND TRIMESTER: ICD-10-CM

## 2025-03-13 DIAGNOSIS — O09.522 MULTIGRAVIDA OF ADVANCED MATERNAL AGE IN SECOND TRIMESTER: Primary | ICD-10-CM

## 2025-03-13 PROCEDURE — 76811 OB US DETAILED SNGL FETUS: CPT

## 2025-03-13 NOTE — PROGRESS NOTES
[facility]  Genetic Counseling Consult    Patient:  Racheal Banks  Preferred Name: Racheal YOB: 1980   Date of Service:  3/13/25   MRN: [id number]    Racheal was seen at the [facility] for genetic consultation. The indication for genetic counseling is advanced maternal age. The patient was unaccompanied to this visit.     The session was conducted in English.      IMPRESSION/ PLAN   1. Racheal has not had genetic screening in this pregnancy and declines options discussed today.    2. During today's [facility] visit, Racheal had a genetic counseling session only. Screening and diagnostic testing was discussed and declined.     3. Racheal had a level II comprehensive anatomy ultrasound today. Please see the ultrasound report for further details.    4. Further recommendation include a follow-up ultrasound with [facility]. The upcoming ultrasound has been scheduled for 4/3/2025. A fetal echocardiogram with pediatric cardiology was also scheduled for 4/15/2025.    PREGNANCY HISTORY   /Parity:       Racheal's pregnancy history is significant for:   Two previous vag-spont term deliveries. Between the  pregnancies, Racheal has a history of 4 early SAB reportedly all in the first two months of pregnancy.     CURRENT PREGNANCY   Current Age: 45 year old   Age at Delivery: 45 year old  FRANCES: 2025, by Ultrasound                                   Gestational Age: 20w2d  This pregnancy is a single gestation.   This pregnancy was conceived spontaneously.    MEDICAL HISTORY   Racheal reported she was diagnosed with diabetes during this pregnancy and has been taking insulin to help with control. We discussed that the average pregnancy has a 3-5% chance of having a baby with a birth defect. Maternal diabetes increases that chance to 6-10% and possibly up to 20% if it is poorly controlled in the first trimester. These birth defects can include  "spinal cord defects (spina bifida), heart defects, skeletal defects, and defects in the urinary, reproductive, and digestive systems. Diabetes in the pregnancy can also lead to complications such as pre-eclampsia, polyhydramnios, and  delivery. Otherwise, Racheal s reported medical history is not expected to impact pregnancy management or risks to fetal development.       FAMILY HISTORY   A three-generation pedigree was obtained today and is scanned under the \"Media\" tab in Epic. The family history was reported by Racheal.    The following significant findings were reported today:   Racheal has a son diagnosed with type 1 diabetes.   Racheal has a personal history of 4 early pregnancy losses.    Family history of diabetes    Diabetes is a complex genetic trait (a multifactorial condition) caused by the combination of genetic and environmental factors. Having a family history of diabetes can increase one's risk of also developing diabetes. For men with type I diabetes, their children have a 1 in 17 chance of developing diabetes during their lifetime. However, for woman with type I diabetes, if their child is born before 25 there is a 1 in 25 risk and if their child was born after 25 there is a 1 in 100 risk. If a parent develops diabetes before age 11, their child's risk is typically doubled. Furthermore, if both parents have type I diabetes, the risk is between 1 in 10 to 1 in 4. These risk numbers are an estimate and can be complicated by many other factors such as autoimmune disorders and lifestyle choices. Therefore, there is currently no prenatal genetic testing we would offer the patient at this time to assess for diabetes risks in the current pregnancy. We also reviewed that individuals with a family history of type II diabetes are generally thought to be at increased risk to develop type II diabetes. Therefore, it is important for individuals with a family history of type II diabetes to let their physicians to " know about this family history, so they can be appropriate screened for diabetes.     Personal history of RPL    Recurrent miscarriage is defined as three or more clinically recognized consecutive or non-consecutive pregnancy losses occurring prior to fetal viability (<24 weeks). Racheal's personal history is significant for 4 pregnancy losses all around two months.      At least 10-15% of the recognized pregnancies end in miscarriage, with most losses occurring in the first trimester. The rate of miscarriage less than 8 weeks gestation may be even greater as some women may not recognize that they are pregnant. Around half of miscarriages that occur before 20 weeks gestation are caused by chromosome abnormalities. Of these chromosomally atypical fetuses, ~ approximately 60% have an autosomal trisomy with trisomy 16 being the most common. Monosomy X is the second most common chromosomal etiology, accounting for ~20%, and triploidy accounts for ~15%. Polyploidies and structural rearrangements constitute the remainder. The risk for a miscarriage increases with advancing maternal age due to a higher incidence of conceptuses with a chromosomal aneuploidy. The risk may approach 75% in women who are 45 years of age and older.     Familial chromosome rearrangements can lead to a history of recurrent pregnancy loss or infertility. They can also increase the potential risk for a stillbirth or liveborn babies with birth differences, developmental concerns, or other health problems. In this case a parent would carry a balanced rearrangement with an equal exchange of chromosome material. An individual with a balanced translocation is typically healthy. However, a child of this individual may inherit one of the rearranged chromosomes, along with typical chromosomes, resulting in an unbalanced rearrangement with total loss and gain of chromosome material. If a conceptus has unbalanced chromosome material it could lead to miscarriages  or an affected child. This result can be dependent on the amount of material that is gained or loss and what chromosome the material is from. If a chromosome translocation is inherited through a family there are typically multiple individuals over multiple generations with recurrent pregnancy loss and/or affected individuals. However, it is also possible for a de tracy translocation or other rearrangement to occur. For couples who have experienced three or more unexplained pregnancy losses, it has been estimated that around 2-5% of these couples may carry a rearrangement. Chromosome rearrangements causing pregnancy loss are more common in the female partner. Chromosome rearrangements in males are more likely to cause infertility. Chromosome analysis on parents is possible by obtaining a karyotype on peripheral blood.     Other genetic causes of recurrent loss could include lethal autosomal recessive conditions of x-linked dominant conditions. Non-genetic causes of pregnancy loss can include maternal uterine anomalies, endocrine concerns such as diabetes or thyroid disease, antiphospholipid syndrome, and environmental agents. Please see [facility] consult for further details.     In about 50% of couples with recurrent pregnancy loss, the etiology remains unknown despite a thorough evaluation and is therefore classified as idiopathic. It is estimated that couples with idiopathic recurrent pregnacy loss can have up to a 75% chance of having a successful pregnancy.     Otherwise, the reported family history is unremarkable for multiple miscarriages, stillbirths, birth defects, intellectual disabilities, known genetic conditions, and consanguinity.       RISK ASSESSMENT FOR INHERITED CONDITIONS AND CARRIER SCREENING OPTIONS   Expanded carrier screening is available to screen for autosomal recessive conditions and X-linked conditions in a large list of genes. Carrier screening does not test the pregnancy but gives a risk  assessment for the pregnancy and future pregnancies to have the condition. Expanded carrier screening is designed to identify carrier status for conditions that are primarily childhood or adolescent onset. Expanded carrier screening does not evaluate for adult-onset conditions such as hereditary cancer syndromes, dementia/ Alzheimer's disease, or cardiovascular disease risk factors. Additionally, expanded carrier screening is not comprehensive for all known genetic diseases or inherited conditions. Carrier screening does not test for all genetic and health conditions or risk factors.     Autosomal recessive conditions happen when a mutation has been inherited from the egg and sperm and include conditions like cystic fibrosis, thalassemia, hearing loss, spinal muscular atrophy, and more. We reviewed that when both biological parents carry a harmful genetic change in a gene associated with autosomal recessive inheritance, each of their pregnancies has a 1 in 4 (25%) chance to be affected by that condition. X-linked conditions happen when a mutation has been inherited from the egg and include conditions like fragile X syndrome.With x-linked conditions, the specific risk generally depends on the chromosomal sex of the fetus, with XY individuals (generally male) being most severely affected.      screening was reviewed. About [place] Screening    The patient does NOT have a family history of known inherited conditions. This does NOT mean the patient and/or their partner is not a carrier of a condition. Approximately 90% of couples at an increased reproductive risk for an inherited condition have no family history of that condition.     The patient has not had carrier screening previously.     The patient declined the carrier screening options. They are aware the option will remain, and they can contact us if they would like to pursue screening. See below for the more detailed information we dicussed.      RISK  ASSESSMENT FOR CHROMOSOME CONDITIONS   We explained that the risk for fetal chromosome abnormalities increases with maternal age. We discussed specific features of common chromosome abnormalities, including trisomy 21 (Down syndrome), trisomy 13, trisomy 18, and sex chromosome trisomies.    At age 45 at midtrimester, the risk to have a baby with Down syndrome is 1 in 31.   At age 45 at midtrimester, the risk to have a baby with any chromosome abnormality is 1 in 19.     Racheal has not had genetic screening in this pregnancy and declines options discussed today.     GENETIC TESTING OPTIONS   Genetic testing during a pregnancy includes screening and diagnostic procedures.      Screening tests are non-invasive which means no risk to the pregnancy and includes ultrasounds and blood work. The benefits and limitations of screening were reviewed. Screening tests provide a risk assessment (chance) specific to the pregnancy for certain fetal chromosome abnormalities but cannot definitively diagnose or exclude a fetal chromosome abnormality. Follow-up genetic counseling and consideration of diagnostic testing is recommended with any abnormal screening result. Diagnostic testing during a pregnancy is more certain and can test for more conditions. However, the tests do have a risk of miscarriage that requires careful consideration. These tests can detect fetal chromosome abnormalities with greater than 99% certainty. Results can be compromised by maternal cell contamination or mosaicism and are limited by the resolution of current genetic testing technology.     There is no screening or diagnostic test that detects all forms of birth defects or intellectual disability.     We discussed the following screening options:     Non-invasive prenatal testing (NIPT)  Also called cell-free DNA screening because it detects chromosomes from the placenta in the pregnant person's blood  Can be done any time after 10 weeks gestation  Standard  recommendation for NIPT screens for trisomy 21, trisomy 18, trisomy 13, with the option of adding sex chromosome aneuploidies, without or without predicted sex  Cannot screen for open neural tube defects, maternal serum AFP after 15 weeks is recommended  New NIPT options include screening for other trisomies, microdeletion syndromes, and in some cases fetal blood antigens. Guidelines do not recommend these conditions are included in standard screening. These options have limitations and should be discussed with a genetic counselor.   However, current (2023) ACMG guidelines do recommend that screening for one microdeletion syndrome, called 22q11.2 deletion syndrome be offered to all pregnant patients. 22q11.2 deletion syndrome has an estimated prevalence of 1 in 990 to 1 in 2148 (0.05-0.1%). Risk is not thought to increase with maternal age. Clinical features are variable but include congenital heart defects, cleft palate, developmental delays, immune system deficiencies, and hearing loss. Approximately 90% of cases are de tracy (a sporadic new change in a pregnancy). Cell-free DNA screening for 22q11.2 deletion syndrome is available with the inclusion of other microdeletion syndromes. There is less data about the performance of cell-free DNA screening for more rare microdeletions and the chance for false positives or negative may be increased.  We discussed the limitations of cell-free DNA screening in detecting microdeletions and the possibility of false positives and false negatives. The patient declined microdeletion syndrome screening.    We discussed the following ultrasound options:    Comprehensive level II ultrasound (Fetal Anatomy Ultrasound)  Ultrasound done between 18-20 weeks gestation  Screens for major birth defects and markers for aneuploidy (like trisomy 21 and trisomy 18)  Includes looking at the fetus/baby's growth, heart, organs (stomach, kidneys), placenta, and amniotic fluid    We discussed the  following diagnostic options:     Amniocentesis  Invasive diagnostic procedure done after 15 weeks gestation  The procedure collects a small sample of amniotic fluid for the purpose of chromosomal testing and/or other genetic testing  Diagnostic result; more than 99% sensitivity for fetal chromosome abnormalities  Testing for AFP in the amniotic fluid can test for open neural tube defects    It was a pleasure to be involved with Racheal s care. I spent  40  minutes on the date of the encounter doing chart review, obtaining history, test coordination, documentation, and further activities as noted above.    Darron Calhoun GC, MS, LGC  Board Certified and [place] Licensed Genetic Counselor  [facility]  Office: [phone]  [facility]: [phone]   Fax: [phone]  [facility]

## 2025-03-13 NOTE — NURSING NOTE
Patient presents to [facility] for GC/L2 at 20w2d due to AMA. Denies LOF, vaginal bleeding, cramping/contractions. SBAR given to DEIDRA MD, see their note in Epic.

## 2025-03-13 NOTE — PROGRESS NOTES
"Please see \"Imaging\" tab under \"Chart Review\" for details of today's visit.    Lucía Salinas    "

## 2025-07-06 LAB — GROUP B STREPTOCOCCUS (EXTERNAL): NEGATIVE

## 2025-07-09 ENCOUNTER — HOSPITAL ENCOUNTER (INPATIENT)
Facility: HOSPITAL | Age: 45
LOS: 1 days | Discharge: HOME | End: 2025-07-10
Attending: ADVANCED PRACTICE MIDWIFE | Admitting: ADVANCED PRACTICE MIDWIFE
Payer: COMMERCIAL

## 2025-07-09 PROBLEM — Z36.89 ENCOUNTER FOR TRIAGE IN PREGNANT PATIENT: Status: ACTIVE | Noted: 2025-07-09

## 2025-07-09 PROBLEM — Z34.90 PREGNANCY: Status: ACTIVE | Noted: 2025-07-09

## 2025-07-09 LAB
ABO + RH BLD: NORMAL
APTT PPP: 26 SECONDS (ref 22–38)
BLD GP AB SCN SERPL QL: NEGATIVE
CLUE CELLS: ABNORMAL
D DIMER PPP FEU-MCNC: 3.48 UG/ML FEU (ref 0–0.5)
ERYTHROCYTE [DISTWIDTH] IN BLOOD BY AUTOMATED COUNT: 14 % (ref 10–15)
FIBRINOGEN PPP-MCNC: 443 MG/DL (ref 170–510)
GLUCOSE BLDC GLUCOMTR-MCNC: 144 MG/DL (ref 70–99)
GLUCOSE BLDC GLUCOMTR-MCNC: 211 MG/DL (ref 70–99)
GLUCOSE BLDC GLUCOMTR-MCNC: 216 MG/DL (ref 70–99)
GLUCOSE BLDC GLUCOMTR-MCNC: 216 MG/DL (ref 70–99)
HCT VFR BLD AUTO: 36.4 % (ref 35–47)
HGB BLD-MCNC: 12.3 G/DL (ref 11.7–15.7)
HOLD SPECIMEN: NORMAL
INR PPP: 0.96 (ref 0.85–1.15)
MCH RBC QN AUTO: 28.7 PG (ref 26.5–33)
MCHC RBC AUTO-ENTMCNC: 33.8 G/DL (ref 31.5–36.5)
MCV RBC AUTO: 85 FL (ref 78–100)
PLATELET # BLD AUTO: 104 10E3/UL (ref 150–450)
PROTHROMBIN TIME: 13.1 SECONDS (ref 11.8–14.8)
RBC # BLD AUTO: 4.28 10E6/UL (ref 3.8–5.2)
SPECIMEN EXP DATE BLD: NORMAL
T PALLIDUM AB SER QL: NONREACTIVE
TRICHOMONAS, WET PREP: ABNORMAL
WBC # BLD AUTO: 9.6 10E3/UL (ref 4–11)
WBC'S/HIGH POWER FIELD, WET PREP: ABNORMAL
YEAST, WET PREP: ABNORMAL

## 2025-07-09 PROCEDURE — 85610 PROTHROMBIN TIME: CPT | Performed by: OBSTETRICS & GYNECOLOGY

## 2025-07-09 PROCEDURE — 250N000012 HC RX MED GY IP 250 OP 636 PS 637

## 2025-07-09 PROCEDURE — 85384 FIBRINOGEN ACTIVITY: CPT | Performed by: OBSTETRICS & GYNECOLOGY

## 2025-07-09 PROCEDURE — 0KQM0ZZ REPAIR PERINEUM MUSCLE, OPEN APPROACH: ICD-10-PCS | Performed by: OBSTETRICS & GYNECOLOGY

## 2025-07-09 PROCEDURE — G0463 HOSPITAL OUTPT CLINIC VISIT: HCPCS | Mod: 6MC

## 2025-07-09 PROCEDURE — 250N000009 HC RX 250: Performed by: ADVANCED PRACTICE MIDWIFE

## 2025-07-09 PROCEDURE — 999N000248 HC STATISTIC IV INSERT WITH US BY RN

## 2025-07-09 PROCEDURE — 85379 FIBRIN DEGRADATION QUANT: CPT | Performed by: OBSTETRICS & GYNECOLOGY

## 2025-07-09 PROCEDURE — 87210 SMEAR WET MOUNT SALINE/INK: CPT | Performed by: ADVANCED PRACTICE MIDWIFE

## 2025-07-09 PROCEDURE — 999N000157 HC STATISTIC RCP TIME EA 10 MIN

## 2025-07-09 PROCEDURE — 250N000011 HC RX IP 250 OP 636: Performed by: ADVANCED PRACTICE MIDWIFE

## 2025-07-09 PROCEDURE — 0UQJXZZ REPAIR CLITORIS, EXTERNAL APPROACH: ICD-10-PCS | Performed by: OBSTETRICS & GYNECOLOGY

## 2025-07-09 PROCEDURE — 86780 TREPONEMA PALLIDUM: CPT | Performed by: ADVANCED PRACTICE MIDWIFE

## 2025-07-09 PROCEDURE — 86900 BLOOD TYPING SEROLOGIC ABO: CPT | Performed by: ADVANCED PRACTICE MIDWIFE

## 2025-07-09 PROCEDURE — 36415 COLL VENOUS BLD VENIPUNCTURE: CPT | Performed by: ADVANCED PRACTICE MIDWIFE

## 2025-07-09 PROCEDURE — 85027 COMPLETE CBC AUTOMATED: CPT | Performed by: ADVANCED PRACTICE MIDWIFE

## 2025-07-09 PROCEDURE — 722N000001 HC LABOR CARE VAGINAL DELIVERY SINGLE

## 2025-07-09 PROCEDURE — 250N000013 HC RX MED GY IP 250 OP 250 PS 637

## 2025-07-09 PROCEDURE — 85730 THROMBOPLASTIN TIME PARTIAL: CPT | Performed by: OBSTETRICS & GYNECOLOGY

## 2025-07-09 PROCEDURE — 250N000011 HC RX IP 250 OP 636: Performed by: OBSTETRICS & GYNECOLOGY

## 2025-07-09 PROCEDURE — 250N000013 HC RX MED GY IP 250 OP 250 PS 637: Performed by: ADVANCED PRACTICE MIDWIFE

## 2025-07-09 PROCEDURE — 120N000001 HC R&B MED SURG/OB

## 2025-07-09 PROCEDURE — 258N000003 HC RX IP 258 OP 636: Performed by: OBSTETRICS & GYNECOLOGY

## 2025-07-09 PROCEDURE — 0UQG7ZZ REPAIR VAGINA, VIA NATURAL OR ARTIFICIAL OPENING: ICD-10-PCS | Performed by: OBSTETRICS & GYNECOLOGY

## 2025-07-09 RX ORDER — OXYTOCIN 10 [USP'U]/ML
10 INJECTION, SOLUTION INTRAMUSCULAR; INTRAVENOUS
Status: DISCONTINUED | OUTPATIENT
Start: 2025-07-09 | End: 2025-07-09 | Stop reason: HOSPADM

## 2025-07-09 RX ORDER — NALOXONE HYDROCHLORIDE 0.4 MG/ML
0.4 INJECTION, SOLUTION INTRAMUSCULAR; INTRAVENOUS; SUBCUTANEOUS
Status: DISCONTINUED | OUTPATIENT
Start: 2025-07-09 | End: 2025-07-10 | Stop reason: HOSPADM

## 2025-07-09 RX ORDER — NALOXONE HYDROCHLORIDE 0.4 MG/ML
0.2 INJECTION, SOLUTION INTRAMUSCULAR; INTRAVENOUS; SUBCUTANEOUS
Status: DISCONTINUED | OUTPATIENT
Start: 2025-07-09 | End: 2025-07-10 | Stop reason: HOSPADM

## 2025-07-09 RX ORDER — NICOTINE POLACRILEX 4 MG
15-30 LOZENGE BUCCAL
Status: DISCONTINUED | OUTPATIENT
Start: 2025-07-09 | End: 2025-07-10 | Stop reason: HOSPADM

## 2025-07-09 RX ORDER — CITRIC ACID/SODIUM CITRATE 334-500MG
30 SOLUTION, ORAL ORAL
Status: DISCONTINUED | OUTPATIENT
Start: 2025-07-09 | End: 2025-07-09 | Stop reason: HOSPADM

## 2025-07-09 RX ORDER — CARBOPROST TROMETHAMINE 250 UG/ML
250 INJECTION, SOLUTION INTRAMUSCULAR
Status: DISCONTINUED | OUTPATIENT
Start: 2025-07-09 | End: 2025-07-09 | Stop reason: HOSPADM

## 2025-07-09 RX ORDER — OXYTOCIN/0.9 % SODIUM CHLORIDE 30/500 ML
340 PLASTIC BAG, INJECTION (ML) INTRAVENOUS CONTINUOUS PRN
Status: DISCONTINUED | OUTPATIENT
Start: 2025-07-09 | End: 2025-07-09 | Stop reason: HOSPADM

## 2025-07-09 RX ORDER — MISOPROSTOL 200 UG/1
400 TABLET ORAL
Status: DISCONTINUED | OUTPATIENT
Start: 2025-07-09 | End: 2025-07-09 | Stop reason: HOSPADM

## 2025-07-09 RX ORDER — SIMETHICONE 80 MG
80 TABLET,CHEWABLE ORAL EVERY 6 HOURS PRN
Status: DISCONTINUED | OUTPATIENT
Start: 2025-07-09 | End: 2025-07-10 | Stop reason: HOSPADM

## 2025-07-09 RX ORDER — OXYTOCIN/0.9 % SODIUM CHLORIDE 30/500 ML
100-340 PLASTIC BAG, INJECTION (ML) INTRAVENOUS CONTINUOUS PRN
Status: DISCONTINUED | OUTPATIENT
Start: 2025-07-09 | End: 2025-07-10 | Stop reason: HOSPADM

## 2025-07-09 RX ORDER — METHYLERGONOVINE MALEATE 0.2 MG/ML
200 INJECTION INTRAVENOUS
Status: DISCONTINUED | OUTPATIENT
Start: 2025-07-09 | End: 2025-07-09 | Stop reason: HOSPADM

## 2025-07-09 RX ORDER — POLYETHYLENE GLYCOL 3350 17 G/17G
17 POWDER, FOR SOLUTION ORAL DAILY
Status: DISCONTINUED | OUTPATIENT
Start: 2025-07-09 | End: 2025-07-10 | Stop reason: HOSPADM

## 2025-07-09 RX ORDER — HYDROCORTISONE 25 MG/G
CREAM TOPICAL 3 TIMES DAILY PRN
Status: DISCONTINUED | OUTPATIENT
Start: 2025-07-09 | End: 2025-07-10 | Stop reason: HOSPADM

## 2025-07-09 RX ORDER — DEXTROSE MONOHYDRATE 25 G/50ML
25-50 INJECTION, SOLUTION INTRAVENOUS
Status: DISCONTINUED | OUTPATIENT
Start: 2025-07-09 | End: 2025-07-10 | Stop reason: HOSPADM

## 2025-07-09 RX ORDER — ONDANSETRON 2 MG/ML
4 INJECTION INTRAMUSCULAR; INTRAVENOUS EVERY 6 HOURS PRN
Status: DISCONTINUED | OUTPATIENT
Start: 2025-07-09 | End: 2025-07-09 | Stop reason: HOSPADM

## 2025-07-09 RX ORDER — SODIUM CHLORIDE, SODIUM LACTATE, POTASSIUM CHLORIDE, CALCIUM CHLORIDE 600; 310; 30; 20 MG/100ML; MG/100ML; MG/100ML; MG/100ML
INJECTION, SOLUTION INTRAVENOUS CONTINUOUS
Status: DISCONTINUED | OUTPATIENT
Start: 2025-07-09 | End: 2025-07-10 | Stop reason: HOSPADM

## 2025-07-09 RX ORDER — ONDANSETRON 4 MG/1
4 TABLET, ORALLY DISINTEGRATING ORAL EVERY 6 HOURS PRN
Status: DISCONTINUED | OUTPATIENT
Start: 2025-07-09 | End: 2025-07-09 | Stop reason: HOSPADM

## 2025-07-09 RX ORDER — OXYTOCIN/0.9 % SODIUM CHLORIDE 30/500 ML
340 PLASTIC BAG, INJECTION (ML) INTRAVENOUS CONTINUOUS PRN
Status: DISCONTINUED | OUTPATIENT
Start: 2025-07-09 | End: 2025-07-10 | Stop reason: HOSPADM

## 2025-07-09 RX ORDER — LOPERAMIDE HYDROCHLORIDE 2 MG/1
4 CAPSULE ORAL
Status: DISCONTINUED | OUTPATIENT
Start: 2025-07-09 | End: 2025-07-10 | Stop reason: HOSPADM

## 2025-07-09 RX ORDER — TRANEXAMIC ACID 10 MG/ML
1 INJECTION, SOLUTION INTRAVENOUS EVERY 30 MIN PRN
Status: DISCONTINUED | OUTPATIENT
Start: 2025-07-09 | End: 2025-07-09 | Stop reason: HOSPADM

## 2025-07-09 RX ORDER — KETOROLAC TROMETHAMINE 15 MG/ML
15 INJECTION, SOLUTION INTRAMUSCULAR; INTRAVENOUS
Status: COMPLETED | OUTPATIENT
Start: 2025-07-09 | End: 2025-07-09

## 2025-07-09 RX ORDER — METHYLERGONOVINE MALEATE 0.2 MG/ML
200 INJECTION INTRAVENOUS
Status: DISCONTINUED | OUTPATIENT
Start: 2025-07-09 | End: 2025-07-10 | Stop reason: HOSPADM

## 2025-07-09 RX ORDER — LOPERAMIDE HYDROCHLORIDE 2 MG/1
2 CAPSULE ORAL
Status: DISCONTINUED | OUTPATIENT
Start: 2025-07-09 | End: 2025-07-09 | Stop reason: HOSPADM

## 2025-07-09 RX ORDER — CARBOPROST TROMETHAMINE 250 UG/ML
250 INJECTION, SOLUTION INTRAMUSCULAR
Status: DISCONTINUED | OUTPATIENT
Start: 2025-07-09 | End: 2025-07-10 | Stop reason: HOSPADM

## 2025-07-09 RX ORDER — TRANEXAMIC ACID 10 MG/ML
1 INJECTION, SOLUTION INTRAVENOUS EVERY 30 MIN PRN
Status: DISCONTINUED | OUTPATIENT
Start: 2025-07-09 | End: 2025-07-10 | Stop reason: HOSPADM

## 2025-07-09 RX ORDER — OXYTOCIN 10 [USP'U]/ML
10 INJECTION, SOLUTION INTRAMUSCULAR; INTRAVENOUS
Status: DISCONTINUED | OUTPATIENT
Start: 2025-07-09 | End: 2025-07-10 | Stop reason: HOSPADM

## 2025-07-09 RX ORDER — CALCIUM CARBONATE 500 MG/1
1000 TABLET, CHEWABLE ORAL EVERY 6 HOURS PRN
Status: DISCONTINUED | OUTPATIENT
Start: 2025-07-09 | End: 2025-07-10 | Stop reason: HOSPADM

## 2025-07-09 RX ORDER — IBUPROFEN 800 MG/1
800 TABLET, FILM COATED ORAL EVERY 6 HOURS PRN
Status: DISCONTINUED | OUTPATIENT
Start: 2025-07-09 | End: 2025-07-10 | Stop reason: HOSPADM

## 2025-07-09 RX ORDER — LOPERAMIDE HYDROCHLORIDE 2 MG/1
4 CAPSULE ORAL
Status: DISCONTINUED | OUTPATIENT
Start: 2025-07-09 | End: 2025-07-09 | Stop reason: HOSPADM

## 2025-07-09 RX ORDER — AMOXICILLIN 250 MG
2 CAPSULE ORAL 2 TIMES DAILY
Status: DISCONTINUED | OUTPATIENT
Start: 2025-07-09 | End: 2025-07-10 | Stop reason: HOSPADM

## 2025-07-09 RX ORDER — IBUPROFEN 800 MG/1
800 TABLET, FILM COATED ORAL
Status: COMPLETED | OUTPATIENT
Start: 2025-07-09 | End: 2025-07-09

## 2025-07-09 RX ORDER — MISOPROSTOL 200 UG/1
800 TABLET ORAL
Status: DISCONTINUED | OUTPATIENT
Start: 2025-07-09 | End: 2025-07-10 | Stop reason: HOSPADM

## 2025-07-09 RX ORDER — CEFAZOLIN SODIUM/WATER 2 G/20 ML
2 SYRINGE (ML) INTRAVENOUS ONCE
Status: COMPLETED | OUTPATIENT
Start: 2025-07-09 | End: 2025-07-09

## 2025-07-09 RX ORDER — METOCLOPRAMIDE HYDROCHLORIDE 5 MG/ML
10 INJECTION INTRAMUSCULAR; INTRAVENOUS EVERY 6 HOURS PRN
Status: DISCONTINUED | OUTPATIENT
Start: 2025-07-09 | End: 2025-07-09 | Stop reason: HOSPADM

## 2025-07-09 RX ORDER — OXYCODONE HYDROCHLORIDE 5 MG/1
5 TABLET ORAL EVERY 4 HOURS PRN
Refills: 0 | Status: DISCONTINUED | OUTPATIENT
Start: 2025-07-09 | End: 2025-07-10 | Stop reason: HOSPADM

## 2025-07-09 RX ORDER — METOCLOPRAMIDE 10 MG/1
10 TABLET ORAL EVERY 6 HOURS PRN
Status: DISCONTINUED | OUTPATIENT
Start: 2025-07-09 | End: 2025-07-09 | Stop reason: HOSPADM

## 2025-07-09 RX ORDER — MISOPROSTOL 200 UG/1
800 TABLET ORAL
Status: DISCONTINUED | OUTPATIENT
Start: 2025-07-09 | End: 2025-07-09 | Stop reason: HOSPADM

## 2025-07-09 RX ORDER — MISOPROSTOL 200 UG/1
400 TABLET ORAL
Status: DISCONTINUED | OUTPATIENT
Start: 2025-07-09 | End: 2025-07-10 | Stop reason: HOSPADM

## 2025-07-09 RX ORDER — LIDOCAINE 40 MG/G
CREAM TOPICAL
Status: DISCONTINUED | OUTPATIENT
Start: 2025-07-09 | End: 2025-07-09 | Stop reason: HOSPADM

## 2025-07-09 RX ORDER — LOPERAMIDE HYDROCHLORIDE 2 MG/1
2 CAPSULE ORAL
Status: DISCONTINUED | OUTPATIENT
Start: 2025-07-09 | End: 2025-07-10 | Stop reason: HOSPADM

## 2025-07-09 RX ORDER — ACETAMINOPHEN 325 MG/1
650 TABLET ORAL EVERY 4 HOURS PRN
Status: DISCONTINUED | OUTPATIENT
Start: 2025-07-09 | End: 2025-07-10 | Stop reason: HOSPADM

## 2025-07-09 RX ORDER — TERBUTALINE SULFATE 1 MG/ML
0.25 INJECTION SUBCUTANEOUS
Status: DISCONTINUED | OUTPATIENT
Start: 2025-07-09 | End: 2025-07-09 | Stop reason: HOSPADM

## 2025-07-09 RX ORDER — OXYTOCIN 10 [USP'U]/ML
10 INJECTION, SOLUTION INTRAMUSCULAR; INTRAVENOUS
Status: COMPLETED | OUTPATIENT
Start: 2025-07-09 | End: 2025-07-09

## 2025-07-09 RX ORDER — PROCHLORPERAZINE MALEATE 10 MG
10 TABLET ORAL EVERY 6 HOURS PRN
Status: DISCONTINUED | OUTPATIENT
Start: 2025-07-09 | End: 2025-07-09 | Stop reason: HOSPADM

## 2025-07-09 RX ADMIN — SENNOSIDES AND DOCUSATE SODIUM 2 TABLET: 50; 8.6 TABLET ORAL at 20:21

## 2025-07-09 RX ADMIN — INSULIN ASPART 2 UNITS: 100 INJECTION, SOLUTION INTRAVENOUS; SUBCUTANEOUS at 18:28

## 2025-07-09 RX ADMIN — ACETAMINOPHEN 650 MG: 325 TABLET ORAL at 20:21

## 2025-07-09 RX ADMIN — POLYETHYLENE GLYCOL 3350 17 G: 17 POWDER, FOR SOLUTION ORAL at 09:59

## 2025-07-09 RX ADMIN — SENNOSIDES AND DOCUSATE SODIUM 2 TABLET: 50; 8.6 TABLET ORAL at 09:59

## 2025-07-09 RX ADMIN — IBUPROFEN 800 MG: 800 TABLET ORAL at 16:23

## 2025-07-09 RX ADMIN — KETOROLAC TROMETHAMINE 15 MG: 15 INJECTION, SOLUTION INTRAMUSCULAR; INTRAVENOUS at 09:22

## 2025-07-09 RX ADMIN — OXYTOCIN 10 UNITS: 10 INJECTION, SOLUTION INTRAMUSCULAR; INTRAVENOUS at 08:16

## 2025-07-09 RX ADMIN — SODIUM CHLORIDE, SODIUM LACTATE, POTASSIUM CHLORIDE, AND CALCIUM CHLORIDE: .6; .31; .03; .02 INJECTION, SOLUTION INTRAVENOUS at 13:39

## 2025-07-09 RX ADMIN — Medication 340 ML/HR: at 08:50

## 2025-07-09 RX ADMIN — TRANEXAMIC ACID 1 G: 10 INJECTION, SOLUTION INTRAVENOUS at 08:42

## 2025-07-09 RX ADMIN — BROMPHENIRAMINE MALEATE, DEXTROMETHORPHAN HBR, PHENYLEPHRINE HCL: 2; 10; 5 LIQUID ORAL at 20:21

## 2025-07-09 RX ADMIN — METFORMIN HYDROCHLORIDE 1000 MG: 500 TABLET, FILM COATED ORAL at 18:28

## 2025-07-09 RX ADMIN — LIDOCAINE HYDROCHLORIDE 20 ML: 10 INJECTION, SOLUTION EPIDURAL; INFILTRATION; INTRACAUDAL; PERINEURAL at 08:41

## 2025-07-09 RX ADMIN — ACETAMINOPHEN 650 MG: 325 TABLET ORAL at 13:30

## 2025-07-09 RX ADMIN — Medication 2 G: at 10:05

## 2025-07-09 RX ADMIN — INSULIN ASPART 2 UNITS: 100 INJECTION, SOLUTION INTRAVENOUS; SUBCUTANEOUS at 14:00

## 2025-07-09 RX ADMIN — ACETAMINOPHEN 650 MG: 325 TABLET ORAL at 10:02

## 2025-07-09 ASSESSMENT — ACTIVITIES OF DAILY LIVING (ADL)
ADLS_ACUITY_SCORE: 25

## 2025-07-09 NOTE — L&D DELIVERY NOTE
VAGINAL DELIVERY NOTE      Racheal Banks MRN# [id number]   Age: 45 year old YOB: 1980     PRE DELIVERY DIAGNOSIS  1) Intrauterine pregnancy at 37w1d   2) Advanced maternal age  3) Type II diabetes, insulin controlled  4) h/o female circumcision  5) h/o 3rd degree laceration      POST DELIVERY DIAGNOSIS  1) Intrauterine pregnancy at 37w1d   2) Delivery of a living male.     1 Minute 5 Minute 10 Minute   Apgar Totals: 8   9          3) Weight:3.7 kg (8 lb 2.5 oz)  4) Postpartum hemorrhage: yes, Delivery QBL (mL): 900 Total:1141mL  5) 2nd degree laceration, left sulcus laceration, right vaginal laceration, left clitoral braun laceration  6) Vaginal varicosities     Delivery Method/Type:   Vaginal, Spontaneous    PROVIDER:   Daxa Mary DO     ANESTHESIA:  none    Labor Complications:    Delivery QBL: 900 mL 900   ROM to Delivery Time: rupture date or rupture time have not been documented    DESCRIPTION OF PROCEDURE  Racheal Banks is a 45 year old  with prenatal care with [facility] who presented at complete dilation and the urge to push. She began spontaneously pushing and fetal descent of the head was noted.  When , local anesthetic was given in anticipation of episiotomy, however with another push the head delivered spontaneously. Racheal Banks delivered a viable infant with apgars of 8  and 9 . Delivery was via vaginal, spontaneous under none anesthesia. Infant delivered in vertex left occiput anterior position. Anterior and posterior shoulders delivered without difficulty. Delayed cord clamping was performed. The cord was clamped, cut twice and 3 vessels were noted.     Cord complications: nuchal - body cord around the right shoulder  Placenta delivered at 2025  8:17 AM. Placental disposition was Hospital disposal. Pitocin was administered after  delivery via IM administration. Fundal massage performed and fundus found to be firm.     There was  difficulty in IV placement therefore PICC was called and did place and IV during the laceration repair and after which TXA was given.     Episiotomy: none   Perineum, vagina, cervix were inspected, and the following lacerations were noted:   Perineal lacerations: 2nd        vaginal laceration noted    on the right  Sulcus laceration on the left  Clitoral braun laceration on the right    A significant arterial bleed was noted on the right vaginal wall.  This was ligated with 3-0 Vicryl and the remainder of the laceration repaired in a running-locked manner.  Hemostasis noted.     Attention was turned to the left sulcus laceration which was oozing from a venous bleed. This was also ligated with 3-0 Vicryl however continued to ooze from the varicosity.  This laceration repair was continued to completed a 2nd degree perineal laceration repair.  Ending back at the area of oozing.  Several more figure of eights were placed and most bleeding stopped.  A herrera catheter was placed followed by vaginal packing.      The uterus remained firm through the laceration repairs.     Needle count correct. Infant and patient in delivery room in stable condition.      Total time for delivery and repair: 60 mins.     DO Darren Sorensen Male-Roda [[id number]]      Labor Events     labor?: No   steroids: None  Labor Type: Spontaneous     Antibiotics received during labor?: No       Rupture date/time: 25           Delivery/Placenta Date and Time      Delivery Date: 25 Delivery Time:  8:14 AM   Placenta Date/Time: 2025  8:17 AM  Oxytocin given at the time of delivery: after delivery of baby  Delivering clinician: Daxa Mary DO   Other personnel present at delivery:  Provider Role   Rosetta Chang RN Lilley, Julie L, RN              Vaginal Counts       Initial count performed by 2 team members:  Two Team Members   Dr. Usman Mcguire RN         [place] Suture Needles Sponges  "(RETIRED) Instruments   Initial counts 0 0 5    Added to count       Relief counts 2 3 10    Final counts               Placed during labor Accounted for at the end of labor   FSE No NA   IUPC No NA   Cervidil No NA                  Final count performed by 2 team members:  Two Team Members   Dr. Usman Mcguire RN   LEEROY Vargas CNM      Final count correct?: Yes  Packing intentionally left In: Yes    Pre-Birth Team Brief: Complete  Post-Birth Team Debrief: Complete       Apgars    Living status: Living   1 Minute 5 Minute 10 Minute 15 Minute 20 Minute   Skin color: 0  1       Heart rate: 2  2       Reflex irritability: 2  2       Muscle tone: 2  2       Respiratory effort: 2  2       Total: 8  9       Apgars assigned by: HANS MCGUIRE RN       Cord      Vessels: 3 Vessels    Cord Complications: Nuchal   Nuchal Intervention: delivered through         Nuchal cord description: loose nuchal cord         Cord Blood Disposition: Lab    Gases Sent?: No    Delayed cord clamping?: Yes    Cord Clamping Delay (seconds):  seconds    Stem cell collection?: No           [place] Measurements      Weight: 8 lb 2.5 oz Length: 1' 7.25\"     Head circumference: 36.2 cm           Skin to Skin and Feeding Plan      Skin to skin initiation date/time: 1841    Skin to skin with: Mother  Skin to skin end date/time:            Labor Events and Shoulder Dystocia    Fetal Tracing Prior to Delivery: Category 1       Delivery (Maternal) (Provider to Complete) ([id number])    Episiotomy: None  Perineal lacerations: 2nd Repaired?: Yes      Repaired?: No      Repaired?: Yes     Vaginal laceration?: Yes Repaired?: Yes   Repair suture: 3-0 Rapide  Number of repair packets: 3  Genital tract inspection done: Pos  Packing Intentionally Left In: Yes       Blood Loss  Mother: Racheal Banks #[id number]     Start of Mother's Information      Delivery Blood Loss   Intrapartum & Postpartum: 25 - 25 0922    Delivery Admission: 25 " 2014 - 07/09/25 0922         Intrapartum & Postpartum Delivery Admission    Delivery QBL (mL) Hospital Encounter 900 mL 900 mL    Postpartum QBL (mL) Hospital Encounter 241 mL 241 mL    Total  1141 mL 1141 mL               End of Mother's Information  Mother: Racheal Banks #[id number]                Delivery - Provider to Complete ([id number])    Delivering clinician: Daxa Mary,   Delivery Type (Choose the 1 that will go to the Birth History): Vaginal, Spontaneous                         Other personnel:  Provider Role   Rosetta Chang RN Lilley, Julie L, RN                     Placenta    Date/Time: 7/9/2025  8:17 AM  Removal: Spontaneous  Disposition: Hospital disposal             Anesthesia    Method: None                    Presentation and Position    Presentation: Vertex    Position: Left Occiput Anterior

## 2025-07-09 NOTE — PROGRESS NOTES
Yessy Toure was notified of pt arrival and contractions. SVE FT/ 60/-3. Orders given for wet prep and UA, and reevaluate in 2 hours for cervical change.

## 2025-07-09 NOTE — PROGRESS NOTES
Late Entry:    The postpartum receiving RN's were able to come to the labor room to receive bedside report and help transfer. RN gave beside report and then we did a fundal check. RN pointed out the vaginal packing. Herrera in place.     [facility] plans to come to the hospital around 1230 to evaluate Racheal and the need for the vaginal packing and herrera. The next RN's will contact her if they do not hear from her.     Dr. Lazar the OB resident was notified about the the [facility]'s hospitalist consult for diabetes.     Racheal transferred to the wheel chair without difficulty and held her infant in her arms to room #15.     See Jose Rose's note.

## 2025-07-09 NOTE — PLAN OF CARE
Problem: Adult Inpatient Plan of Care  Goal: Optimal Comfort and Wellbeing  Outcome: Progressing  Intervention: Monitor Pain and Promote Comfort  Recent Flowsheet Documentation  Taken 7/9/2025 1623 by Brandi Benton RN  Pain Management Interventions:   medication (see MAR)   rest  Intervention: Provide Person-Centered Care  Recent Flowsheet Documentation  Taken 7/9/2025 1620 by Brandi Benton RN  Trust Relationship/Rapport:   care explained   choices provided   emotional support provided   empathic listening provided   questions answered   questions encouraged   reassurance provided   thoughts/feelings acknowledged     Problem: Postpartum (Vaginal Delivery)  Goal: Optimal Pain Control and Function  Outcome: Progressing  Intervention: Prevent or Manage Pain  Recent Flowsheet Documentation  Taken 7/9/2025 1623 by Brandi Benton RN  Pain Management Interventions:   medication (see MAR)   rest     Goal Outcome Evaluation:      Plan of Care Reviewed With: patient    Overall Patient Progress: improvingOverall Patient Progress: improving    Outcome Evaluation: Pt vitals and assessments are WDL. Pt had low urine output earlier today - herrera order to stay in until adequate output; urine output went from dark xiomara to transparent yellow with LR running and herrera place - herrera removed @ 1745, due to void by 2145. Pt denies headache, visual changes, dizziness, and epigastric pain. Fundus firm without massage. Pain has been adequately controlled by tylenol and ibuprofen. Neighbors are at bedside and supportive of pt. Pt and friends are attentive to infant and active in cares. Pt is breast and formula feeding baby q2-3 hours. Educated on audible swallows, pumping with supplementation, and pace bottle feeding - pt verbalized understanding. Patient is steady - educated on getting up slowly and utilizing call light. Tolerating fluids and foods. Paperwork is at bedside - no ROP is needed.

## 2025-07-09 NOTE — PLAN OF CARE
Goal Outcome Evaluation:      Plan of Care Reviewed With: patient, family    Overall Patient Progress: improvingOverall Patient Progress: improving    Outcome Evaluation: VSS. Fundus and assessment WDL, minimal bleeding. Interacting with baby boy. BF/formula feed. LR running at 100mL/hr. Denies epigastric pain, headache, and visual changes. Vaginal packing removed per provider approval. Soaked packing. Catheter still in place.      Problem: Postpartum (Vaginal Delivery)  Goal: Optimal Pain Control and Function  Outcome: Progressing  Tylenol given with relief.    Problem: Postpartum (Vaginal Delivery)  Goal: Effective Urinary Elimination  Outcome: Progressing

## 2025-07-09 NOTE — PROGRESS NOTES
D:  Patient admitted to [facility]4 via wheelchair with  and support people.  A:  Bedside report received from Nhi LUCERO. Oriented patient and family to surroundings; call light within reach. 4 Part ID bands double checked with reporting RN.  R:  Patient and  tolerated transfer. Care assumed.

## 2025-07-09 NOTE — PROGRESS NOTES
[facility]    Progress Note - Hospitalist Service       Date of Admission:  7/9/2025    Assessment & Plan   Racheal Banks is a 45 year old female admitted on 7/9/2025 to postpartum after an uncomplicated vaginal leary birth. She has a history of type II diabetes and is requiring diabetes management.    She has been taking 2000mg daily Metformin at home prior to pregnancy and added insulin during pregnancy at 26 units long acting plus 8-10 units at mealtime.    She takes no other home medications.    Plan:  - We will use a moderate sliding scale for insulin and continue metformin 1000mg BID at mealtime.   - Continue sugar checks at mealtime and bedtime  - Plan discussed with patient and nursing team who are agreeable     The patient's care was discussed with the Attending Physician, Dr. Oh.    Myra Lazar DO  Hospitalist Service  [facility]  Securely message with [employer] (more info)  Text page via [employer] Paging/Directory   ______________________________________________________________________      Physical Exam   Vital Signs: Temp: 98.4  F (36.9  C)   BP: 126/69 Pulse: 78   Resp: 18 SpO2: 100 % O2 Device: None (Room air)    Weight: 181 lbs 0 oz

## 2025-07-09 NOTE — PLAN OF CARE
Late entry     @0814. RN received report in the morning to collect a wet prep and a ua and them recheck a SVE around 0845. Around 0750 Racheal started to have increased pressure and started involuntarily pushing. RN did a SVE and found cervix to be complete and 0 station. SROM time is unknown. There was scant fluid. RN called for assistance.     Dr. Mary came to the bedside. Racheal was wheeled to room #9. Cassy MIDDLETON CNM called for delivery. Racheal continued to delivery. See Dr. Mary's note     Goal Outcome Evaluation:      Plan of Care Reviewed With: patient    Overall Patient Progress: improvingOverall Patient Progress: improving    Outcome Evaluation: ,      Problem: Postpartum (Vaginal Delivery)  Goal: Hemostasis  Outcome: Progressing     Bleeding is now stable, fundus firm      Problem: Postpartum (Vaginal Delivery)  Goal: Optimal Pain Control and Function  Outcome: Progressing     Toradol and tylenol given for pain      Problem: Postpartum (Vaginal Delivery)  Goal: Successful Parent Role Transition  Outcome: Progressing     Mom is thrilled with  and participates in cares    Plan to transfer to postpartum

## 2025-07-09 NOTE — H&P
"HISTORY AND PHYSICAL UPDATE ADMISSION EXAM    Name: Racheal Banks  YOB: 1980  Medical Record Number: [id number]    History of Present Illness: Racheal Banks is a 45 year old female who is 37w1d pregnant and being admitted for active labor management. Contractions started at 0400. She was fingertip dilation in triage at 0645 rapidly progressed to complete and had precipitous delivery with IHOB at 0814.     Estimated Date of Delivery: 2025    EGA 37w1d    OB History    Para Term  AB Living   7 2 2 0 4 2   SAB IAB Ectopic Multiple Live Births   4 0 0 0 2      # Outcome Date GA Lbr Franklyn/2nd Weight Sex Type Anes PTL Lv   7 Current            6 Term 23 39w5d 02:49 / 03:00 4.16 kg (9 lb 2.7 oz) F Vag-Spont Local N MOLLY      Name: Mae Valadez      Apgar1: 9  Apgar5: 9   5 2022     SAB      4 2021     SAB      3 2020     SAB      2 2019     SAB      1 Term 2017 42w0d  3.175 kg (7 lb)  Vag-Spont   MOLLY        Lab Results   Component Value Date    AS Negative 2023    HGB 12.1 11/10/2023       Prenatal Complications:   DM2  Hx 3rd deg lac  AMA 43 at delivery- on metformin and insulin  Hx RPL, MTHFR    Exam:      /59   Temp 98.4  F (36.9  C)   Resp 18   Ht 1.676 m (5' 6\")   Wt 82.1 kg (181 lb)   LMP 10/30/2024   BMI 29.21 kg/m      Fetal heart Rate Category 1  Contractions Q2-4 on arrival  NST reactive on admit    Assessment: precipitous delivery w/ IHOB complicated by complex laceration requiring vaginal packing     Plan:  Hospitalist consult for DM2 postpartum management  Stage 2 PPH order set placed. Hemorrhage was from laceration. Labs ordered, repeat labs tomorrow, sooner if indicated.   Remove vaginal packing after 4 hours, if bleeding stable and ambulatory remove herrera then  Mag packs ordered  Routine postpartum cares    Prenatal record reviewed.    Dr. Senior aware of patient status and remains available for consultation and collaboration " as needed.    KENNA Salas CNM      7/9/2025   9:16 AM

## 2025-07-09 NOTE — PROGRESS NOTES
Data: Patient presented to [facility]: 2025  6:20 AM.  Reason for maternal/fetal assessment is uterine contractions. Patient reports having contractions since about 0400. Patient denies abdominal pain, pelvic pressure, nausea, vomiting. Patient reports fetal movement is normal. Patient is a 37w1d . Prenatal record reviewed. Pregnancy has Sbeen complicated by diabetes. upport person is present.     Fetal HR baseline was  , variability is  . Accelerations:  . Decelerations:  . Uterine assessment is   during contractions and   at rest. Cervix 0.5 cm dilated and 60-70% effaced. Fetal station -3. Fetal presentation   per cervical exam. Membranes: intact.    Vital signs wnl. Patient reports pain and is coping.     Action: Verbal consent for EFM. Triage assessment completed. Patient may meet criteria for early labor discharge.     Response: Patient verbalized understanding of triage assessment. Will contact Yessy Cardenas CNM with assessment and consideration of early labor discharge vs admission.

## 2025-07-09 NOTE — PROGRESS NOTES
Notified of imminent delivery at 0803. Was reportedly fingertip dilation at 0645. I was immediately en route. IHOB attended delivery which occurred at 0814. Upon my arrival, baby and placenta delivered and Dr. Mary completing extensive repair. Stayed at bedside to assist with retraction and suction. Vaginal packing placed by Dr. Mary.     KENNA Salas CNM on 7/9/2025 at 9:18 AM

## 2025-07-10 VITALS
HEART RATE: 69 BPM | DIASTOLIC BLOOD PRESSURE: 66 MMHG | SYSTOLIC BLOOD PRESSURE: 116 MMHG | OXYGEN SATURATION: 98 % | RESPIRATION RATE: 16 BRPM | BODY MASS INDEX: 27.34 KG/M2 | TEMPERATURE: 98.2 F | WEIGHT: 170.1 LBS | HEIGHT: 66 IN

## 2025-07-10 LAB
ERYTHROCYTE [DISTWIDTH] IN BLOOD BY AUTOMATED COUNT: 14.3 % (ref 10–15)
GLUCOSE BLDC GLUCOMTR-MCNC: 80 MG/DL (ref 70–99)
GLUCOSE BLDC GLUCOMTR-MCNC: 87 MG/DL (ref 70–99)
HCT VFR BLD AUTO: 26.7 % (ref 35–47)
HGB BLD-MCNC: 9.3 G/DL (ref 11.7–15.7)
MCH RBC QN AUTO: 29.6 PG (ref 26.5–33)
MCHC RBC AUTO-ENTMCNC: 34.8 G/DL (ref 31.5–36.5)
MCV RBC AUTO: 85 FL (ref 78–100)
PLATELET # BLD AUTO: 99 10E3/UL (ref 150–450)
RBC # BLD AUTO: 3.14 10E6/UL (ref 3.8–5.2)
WBC # BLD AUTO: 9.9 10E3/UL (ref 4–11)

## 2025-07-10 PROCEDURE — 250N000013 HC RX MED GY IP 250 OP 250 PS 637

## 2025-07-10 PROCEDURE — 85027 COMPLETE CBC AUTOMATED: CPT | Performed by: ADVANCED PRACTICE MIDWIFE

## 2025-07-10 PROCEDURE — 36415 COLL VENOUS BLD VENIPUNCTURE: CPT | Performed by: ADVANCED PRACTICE MIDWIFE

## 2025-07-10 PROCEDURE — 250N000013 HC RX MED GY IP 250 OP 250 PS 637: Performed by: ADVANCED PRACTICE MIDWIFE

## 2025-07-10 RX ORDER — ACETAMINOPHEN 325 MG/1
650 TABLET ORAL EVERY 4 HOURS PRN
COMMUNITY
Start: 2025-07-10

## 2025-07-10 RX ORDER — IBUPROFEN 800 MG/1
800 TABLET, FILM COATED ORAL EVERY 6 HOURS PRN
COMMUNITY
Start: 2025-07-10

## 2025-07-10 RX ADMIN — IBUPROFEN 800 MG: 800 TABLET ORAL at 15:49

## 2025-07-10 RX ADMIN — IBUPROFEN 800 MG: 800 TABLET ORAL at 00:18

## 2025-07-10 RX ADMIN — ACETAMINOPHEN 650 MG: 325 TABLET ORAL at 15:48

## 2025-07-10 RX ADMIN — METFORMIN HYDROCHLORIDE 1000 MG: 500 TABLET, FILM COATED ORAL at 16:26

## 2025-07-10 RX ADMIN — SENNOSIDES AND DOCUSATE SODIUM 2 TABLET: 50; 8.6 TABLET ORAL at 08:25

## 2025-07-10 RX ADMIN — POLYETHYLENE GLYCOL 3350 17 G: 17 POWDER, FOR SOLUTION ORAL at 08:25

## 2025-07-10 RX ADMIN — ACETAMINOPHEN 650 MG: 325 TABLET ORAL at 08:28

## 2025-07-10 RX ADMIN — BENZOCAINE AND LEVOMENTHOL: 200; 5 SPRAY TOPICAL at 15:50

## 2025-07-10 RX ADMIN — METFORMIN HYDROCHLORIDE 1000 MG: 500 TABLET, FILM COATED ORAL at 08:25

## 2025-07-10 ASSESSMENT — ACTIVITIES OF DAILY LIVING (ADL)
ADLS_ACUITY_SCORE: 24

## 2025-07-10 NOTE — PLAN OF CARE
"Goal Outcome Evaluation:      Plan of Care Reviewed With: patient    Overall Patient Progress: improvingOverall Patient Progress: improving    Outcome Evaluation: Pt vitally stable and assessments WNL. Voiding trials completed. Insulin given per orders. Up ad jose. Supported by family and friends.        /59 (BP Location: Right arm, Patient Position: Semi-Hahn's, Cuff Size: Adult Large)   Pulse 63   Temp 98.4  F (36.9  C) (Oral)   Resp 16   Ht 1.676 m (5' 6\")   Wt 82.1 kg (181 lb)   LMP 10/30/2024   SpO2 99%   Breastfeeding Unknown   BMI 29.21 kg/m     "

## 2025-07-10 NOTE — PLAN OF CARE
Problem: Postpartum (Vaginal Delivery)  Goal: Successful Parent Role Transition  Outcome: Progressing  Intervention: Support Parent Role Transition  Recent Flowsheet Documentation  Taken 7/10/2025 0400 by Kathrin Mckinley RN  Supportive Measures:   active listening utilized   goal-setting facilitated  Parent-Child Attachment Promotion: caring behavior modeled  Taken 7/10/2025 0000 by Kathrin Mckinley RN  Supportive Measures:   active listening utilized   goal-setting facilitated  Parent-Child Attachment Promotion: caring behavior modeled  Goal: Hemostasis  Outcome: Progressing  Goal: Absence of Infection Signs and Symptoms  Outcome: Progressing  Goal: Optimal Pain Control and Function  Outcome: Progressing  Intervention: Prevent or Manage Pain  Recent Flowsheet Documentation  Taken 7/10/2025 0400 by Kathrin Mckinley RN  Perineal Care:   absorbent brief/pad changed   perineal spray bottle/warm water use encouraged   perineum cleansed  Taken 7/10/2025 0100 by Kathrin Mckinley RN  Pain Management Interventions:   medication offered but refused   repositioned   rest  Taken 7/10/2025 0000 by Kathrin Mckinley RN  Pain Management Interventions: medication (see MAR)  Perineal Care:   absorbent brief/pad changed   perineal spray bottle/warm water use encouraged   perineum cleansed  Goal: Effective Urinary Elimination  Outcome: Progressing   Goal Outcome Evaluation:    Data: Vital signs within normal limits. Postpartum checks within normal limits - see flow record. Patient eating and drinking normally. Patient able to empty bladder independently and is up ambulating. No apparent signs of infection. 2nd degree lacerations  healing well. Patient performing self cares and is able to care for infant.  Action: Patient medicated during the shift for cramping. See MAR. Patient reassessed within 1 hour after  medication and patient was sleeping.  Response: Positive attachment behaviors observed with infant.   Plan: Continue to  monitor postpartum recovery.

## 2025-07-10 NOTE — DISCHARGE SUMMARY
OB Discharge Summary      Date:  7/10/2025    Name:  Racheal Banks  :  1980  MRN:  [id number]      Admission Date:  2025  Delivery Date: 2025  Gestational Age at Delivery:  37w1d  Discharge Date:  7/10/2025    Principal Diagnosis:    Patient Active Problem List   Diagnosis     (normal spontaneous vaginal delivery)    Third degree perineal laceration    Encounter for triage in pregnant patient    Pregnancy         Conditions complicating Pregnancy:   DM2  Hx 3rd deg lac  AMA 43 at delivery- on metformin and insulin  Hx RPL, MTHFR    Procedure(s) Performed:  NSVB    Indication for :    Indication for Induction:       Condition at Discharge:  stable    Discharge Medications:      Review of your medicines        START taking        Dose / Directions   metFORMIN 1000 MG tablet  Commonly known as: GLUCOPHAGE      Dose: 1,000 mg  Take 1 tablet (1,000 mg) by mouth 2 times daily (with meals).  Refills: 0            CONTINUE these medicines which may have CHANGED, or have new prescriptions. If we are uncertain of the size of tablets/capsules you have at home, strength may be listed as something that might have changed.        Dose / Directions   * acetaminophen 325 MG tablet  Commonly known as: TYLENOL  This may have changed: Another medication with the same name was added. Make sure you understand how and when to take each.  Used for:  (normal spontaneous vaginal delivery), Third degree perineal laceration      Dose: 650 mg  Take 2 tablets (650 mg) by mouth every 4 hours as needed for mild pain  Quantity: 90 tablet  Refills: 0     * acetaminophen 325 MG tablet  Commonly known as: TYLENOL  This may have changed: You were already taking a medication with the same name, and this prescription was added. Make sure you understand how and when to take each.  Used for: Care and examination of lactating mother      Dose: 650 mg  Take 2 tablets (650 mg) by mouth every 4 hours as needed for fever or  other (second line or per patient preference for mild to moderate pain management).  Refills: 0     * ibuprofen 800 MG tablet  Commonly known as: ADVIL/MOTRIN  This may have changed: Another medication with the same name was added. Make sure you understand how and when to take each.  Used for:  (normal spontaneous vaginal delivery), Third degree perineal laceration      Dose: 800 mg  Take 1 tablet (800 mg) by mouth every 6 hours as needed for other (cramping)  Quantity: 60 tablet  Refills: 0     * ibuprofen 800 MG tablet  Commonly known as: ADVIL/MOTRIN  This may have changed: You were already taking a medication with the same name, and this prescription was added. Make sure you understand how and when to take each.  Used for: Care and examination of lactating mother      Dose: 800 mg  Take 1 tablet (800 mg) by mouth every 6 hours as needed for other (first line or per patient preference for mild to moderate pain management).  Refills: 0           * This list has 4 medication(s) that are the same as other medications prescribed for you. Read the directions carefully, and ask your doctor or other care provider to review them with you.                CONTINUE these medicines which have NOT CHANGED        Dose / Directions   docusate sodium 100 MG capsule  Commonly known as: COLACE  Used for:  (normal spontaneous vaginal delivery), Third degree perineal laceration      Dose: 100 mg  Take 1 capsule (100 mg) by mouth 2 times daily as needed for constipation  Quantity: 90 capsule  Refills: 0     multivitamin w/minerals tablet  Used for: Preventative health care, Neck pain      Dose: 1 tablet  Take 1 tablet by mouth daily.  Quantity: 100 tablet  Refills: 3     NO ACTIVE MEDICATIONS      Refills: 0     vitamin D3 50 mcg (2000 units) tablet  Commonly known as: CHOLECALCIFEROL  Used for: Vitamin D deficiency      Dose: 2,000 Units  Take 2,000 Units by mouth daily.  Quantity: 100 tablet  Refills: 3               Where  to get your medicines        Some of these will need a paper prescription and others can be bought over the counter. Ask your nurse if you have questions.    You don't need a prescription for these medications  acetaminophen 325 MG tablet  ibuprofen 800 MG tablet          Discharge Plan:    Follow up with /NAHUM:  6 weeks PP and as needed   Patient Instructions:      Physical activity: As tolerated. Nothing in the vagina for 6 weeks.      Diet:  regular    Medication: see med list. Manage diabetes per recommendations of the hospitalist.    Other:        Physician/MATTHEWM: Albert Macdonald CNM    Name:  Racheal Banks  :  1980  MRN:  [id number]

## 2025-07-10 NOTE — PLAN OF CARE
Problem: Adult Inpatient Plan of Care  Goal: Readiness for Transition of Care  Outcome: Met     Goal Outcome Evaluation:      Plan of Care Reviewed With: patient    Overall Patient Progress: improvingOverall Patient Progress: improving    Outcome Evaluation: Pt vitals and assessments are WDL. Pt is discharging. Pt denies headache, visual changes, dizziness, and epigastric pain. Fundus firm without massage. Pain has been adequately controlled by tylenol and ibuprofen. Friends are at bedside and supportive of pt. Pt and spouse are attentive to infant and active in cares. Pt is breast and formula feeding baby q2-3 hours. Patient is up ad jose. Tolerating fluids and foods - voiding without difficulty. Birth certificate and PPMA are done. Pt's medications were sent to preferred pharmacy per pt request and through MD's clinic so not on discharge paperwork. Pt already has a breast pump at home. Weight obtained. Educated patient on medications, worsening symptoms, and follow-up care. Pt verbalized understanding with no further questions.       D:  Patient desires discharge home.  Discharge orders received and entered by provider.  A:  Discharge instructions reviewed with the patient.  All questions and concerns addressed.  R:  Discharge criteria met.  4 Part ID bands double checked.   discharged in car seat with mother.  The nursing assistant escorted patient to car .

## 2025-07-10 NOTE — PROGRESS NOTES
Patient discharged today per Albert Macdonald CNM to resume metformin outpatient. Follow up with PCP at [facility].    Myra Lazar, PGY1

## 2025-07-10 NOTE — DISCHARGE INSTRUCTIONS
[facility] is holding an appointment for mom and baby on Friday 7/11/25. They will call you to schedule a time.    Warning Signs after Having a Baby    Keep this paper on your fridge or somewhere else where you can see it.    Call your provider if you have any of these symptoms up to 12 weeks after having your baby.    Thoughts of hurting yourself or your baby  Pain in your chest or trouble breathing  Severe headache not helped by pain medicine  Eyesight concerns (blurry vision, seeing spots or flashes of light, other changes to eyesight)  Fainting, shaking or other signs of a seizure    Call 9-1-1 if you feel that it is an emergency.     The symptoms below can happen to anyone after giving birth. They can be very serious. Call your provider if you have any of these warning signs.    My provider s phone number: _______________________    Losing too much blood (hemorrhage)    Call your provider if you soak through a pad in less than an hour or pass blood clots bigger than a golf ball. These may be signs that you are bleeding too much.    Blood clots in the legs or lungs    After you give birth, your body naturally clots its blood to help prevent blood loss. Sometimes this increased clotting can happen in other areas of the body, like the legs or lungs. This can block your blood flow and be very dangerous.     Call your provider if you:  Have a red, swollen spot on the back of your leg that is warm or painful when you touch it.   Are coughing up blood.     Infection    Call your provider if you have any of these symptoms:  Fever of 100.4 F (38 C) or higher.  Pain or redness around your stitches if you had an incision.   Any yellow, white, or green fluid coming from places where you had stitches or surgery.    Mood Problems (postpartum depression)    Many people feel sad or have mood changes after having a baby. But for some people, these mood swings are worse.     Call your provider right away if you feel so  anxious or nervous that you can't care for yourself or your baby.  Preeclampsia (high blood pressure)    Even if you didn't have high blood pressure when you were pregnant, you are at risk for the high blood pressure disease called preeclampsia. This risk can last up to 12 weeks after giving birth.     Call your provider if you have:   Pain on your right side under your rib cage  Sudden swelling in the hands and face    Remember: You know your body. If something doesn't feel right, get medical help.     For informational purposes only. Not to replace the advice of your health care provider. Copyright 2020 [employer]. All rights reserved. Clinically reviewed by SHAINA Ramirez-OB, MSN. [employer] 143104 - Rev 02/23.

## 2025-07-10 NOTE — LACTATION NOTE
This note was copied from a baby's chart.  Initial Lactation Visit    Current age: 1 day 2  hours old    Gestational age at delivery: 37w1d     Diaper count (last 24 hours): 2 wet, 4 soiled Meconium     Feedings(last 24 hours): 3 breast  and 4 Formula 20k/keith 5-32 mL    Weight gain:   3% at 24 hours    Breastfeeding goals:  12+months- does do a little formula.      Past breastfeeding experience: yes- 1-2 years     Maternal risk that may affect breastfeeding: AMA, QBL >1000    Home Pump: Wearable    Breast assessment: Breast: Round, Large, Symmetrical, and Soft , Nipples: Everted    Infant oral assessment:     Feeding assessment:  Mom latched infant onto to breast in cradle hold, infant latched had a short burst then went to sleep. Hand expressed large dropped,  adjusted mom's hold putting infant in cross cradle, infant was able to latch and maintain latch, with swallows noted.       Visit Summary: reviewed feeding plan and copied to AVS.         Follow up: LC will continue to follow up during hospital stay.

## 2025-07-10 NOTE — PLAN OF CARE
Goal Outcome Evaluation:      Plan of Care Reviewed With: patient    Overall Patient Progress: improvingOverall Patient Progress: improving    Outcome Evaluation: VSS and assessments WDL. Ambulating well. Intaking well. Insulin given per orders. Breastfeeding well. Supported by friends and family. Denies headaches, vision changes, and epigastric pain.      Problem: Adult Inpatient Plan of Care  Goal: Readiness for Transition of Care  Outcome: Progressing

## 2025-07-10 NOTE — PROGRESS NOTES
"Vaginal Delivery Postpartum Day 1    Patient Name:  Racheal Banks  :      1980  MRN:      [id number]      Assessment:  Normal postpartum course.    Plan:  Continue current care and discharge to home per pt request.  Hospitalist managing insulin.      Subjective:  The patient feels well:  Voiding without difficulty, lochia normal, tolerating normal diet, ambulating without difficulty and passing flatus. Voiding independently without complication. Pain is well controlled with current medications.  The patient has no emotional concerns.  The baby is well and being fed by bottle.      YOB: 2023  Birth Time: 8:14 AM    Prenatal Complications include:   DM2  Hx 3rd deg lac  AMA 43 at delivery- on metformin and insulin  Hx RPL, MTHFR    Objective:  /58 (BP Location: Right arm)   Pulse 68   Temp 98  F (36.7  C) (Oral)   Resp 16   Ht 1.676 m (5' 6\")   Wt 82.1 kg (181 lb)   LMP 10/30/2024   SpO2 99%   Breastfeeding Unknown   BMI 29.21 kg/m    Patient Vitals for the past 24 hrs:   BP Temp Temp src Pulse Resp SpO2   07/10/25 0749 112/58 98  F (36.7  C) Oral 68 16 99 %   07/10/25 0400 116/59 98.3  F (36.8  C) Oral 70 16 100 %   07/10/25 0000 121/63 99.1  F (37.3  C) Oral 72 18 100 %   25 2021 109/59 98.4  F (36.9  C) Oral 63 16 99 %   25 1555 120/65 97.9  F (36.6  C) Oral 67 16 99 %   25 1209 126/69 -- -- 78 18 100 %       Exam: Patient A&O x 3. No acute distress, breathing unlabored.The amount and color of the lochia is appropriate for the duration of recovery. Uterine fundus is firm at U-2. Perineum: not examined. States feeling ok.      Lab Results   Component Value Date    AS Negative 2025    HGB 9.3 (L) 07/10/2025       Immunization History   Administered Date(s) Administered    Influenza (IIV3) PF 2008    MMR (MMRII) 2004, 2004, 2005    TD,PF 7+ (Tenivac) 2004, 2004, 2005, 2005    Tetanus 2004       Provider:  " Albert Macdonald CNM    Date:  7/10/2025  Time:  10:51 AM

## 2025-07-11 ENCOUNTER — MEDICAL CORRESPONDENCE (OUTPATIENT)
Dept: HEALTH INFORMATION MANAGEMENT | Facility: CLINIC | Age: 45
End: 2025-07-11
Payer: COMMERCIAL